# Patient Record
Sex: MALE | Race: WHITE | Employment: FULL TIME | URBAN - METROPOLITAN AREA
[De-identification: names, ages, dates, MRNs, and addresses within clinical notes are randomized per-mention and may not be internally consistent; named-entity substitution may affect disease eponyms.]

---

## 2020-12-24 ENCOUNTER — APPOINTMENT (OUTPATIENT)
Dept: CT IMAGING | Age: 75
DRG: 862 | End: 2020-12-24
Attending: EMERGENCY MEDICINE
Payer: MEDICARE

## 2020-12-24 ENCOUNTER — APPOINTMENT (OUTPATIENT)
Dept: GENERAL RADIOLOGY | Age: 75
DRG: 862 | End: 2020-12-24
Attending: EMERGENCY MEDICINE
Payer: MEDICARE

## 2020-12-24 ENCOUNTER — HOSPITAL ENCOUNTER (INPATIENT)
Age: 75
LOS: 3 days | Discharge: HOME | DRG: 862 | End: 2020-12-27
Attending: EMERGENCY MEDICINE | Admitting: FAMILY MEDICINE
Payer: MEDICARE

## 2020-12-24 DIAGNOSIS — R65.20 SEPSIS WITH ACUTE ORGAN DYSFUNCTION WITHOUT SEPTIC SHOCK, DUE TO UNSPECIFIED ORGANISM, UNSPECIFIED TYPE (HCC): Primary | ICD-10-CM

## 2020-12-24 DIAGNOSIS — R00.0 TACHYCARDIA: ICD-10-CM

## 2020-12-24 DIAGNOSIS — R77.8 TROPONIN LEVEL ELEVATED: ICD-10-CM

## 2020-12-24 DIAGNOSIS — I21.A1 TYPE 2 MYOCARDIAL INFARCTION (HCC): ICD-10-CM

## 2020-12-24 DIAGNOSIS — R82.81 PYURIA: ICD-10-CM

## 2020-12-24 DIAGNOSIS — A41.9 SEPSIS WITH ACUTE ORGAN DYSFUNCTION WITHOUT SEPTIC SHOCK, DUE TO UNSPECIFIED ORGANISM, UNSPECIFIED TYPE (HCC): Primary | ICD-10-CM

## 2020-12-24 PROBLEM — D72.829 LEUKOCYTOSIS: Status: ACTIVE | Noted: 2020-12-24

## 2020-12-24 PROBLEM — R50.9 FEVER: Status: ACTIVE | Noted: 2020-12-24

## 2020-12-24 PROBLEM — N39.0 UTI (URINARY TRACT INFECTION): Status: ACTIVE | Noted: 2020-12-24

## 2020-12-24 PROBLEM — I21.9 MI (MYOCARDIAL INFARCTION) (HCC): Status: ACTIVE | Noted: 2020-12-24

## 2020-12-24 PROBLEM — R31.9 HEMATURIA: Status: ACTIVE | Noted: 2020-12-24

## 2020-12-24 LAB
ALBUMIN SERPL-MCNC: 4.2 G/DL (ref 3.5–5)
ALBUMIN/GLOB SERPL: 1.3 {RATIO} (ref 1.1–2.2)
ALP SERPL-CCNC: 60 U/L (ref 45–117)
ALT SERPL-CCNC: 28 U/L (ref 12–78)
ANION GAP SERPL CALC-SCNC: 7 MMOL/L (ref 5–15)
APPEARANCE UR: ABNORMAL
APTT PPP: 26.2 SEC (ref 22.1–31)
AST SERPL-CCNC: 19 U/L (ref 15–37)
BACTERIA URNS QL MICRO: NEGATIVE /HPF
BASOPHILS # BLD: 0.2 K/UL (ref 0–0.1)
BASOPHILS NFR BLD: 1 % (ref 0–1)
BILIRUB SERPL-MCNC: 1.1 MG/DL (ref 0.2–1)
BILIRUB UR QL: NEGATIVE
BNP SERPL-MCNC: 427 PG/ML
BUN SERPL-MCNC: 19 MG/DL (ref 6–20)
BUN/CREAT SERPL: 15 (ref 12–20)
CALCIUM SERPL-MCNC: 9.1 MG/DL (ref 8.5–10.1)
CHLORIDE SERPL-SCNC: 103 MMOL/L (ref 97–108)
CO2 SERPL-SCNC: 27 MMOL/L (ref 21–32)
COLOR UR: ABNORMAL
COMMENT, HOLDF: NORMAL
COVID-19 RAPID TEST, COVR: NOT DETECTED
CREAT SERPL-MCNC: 1.3 MG/DL (ref 0.7–1.3)
CRP SERPL-MCNC: 1.47 MG/DL (ref 0–0.6)
D DIMER PPP FEU-MCNC: 0.43 MG/L FEU (ref 0–0.65)
DIFFERENTIAL METHOD BLD: ABNORMAL
EOSINOPHIL # BLD: 0 K/UL (ref 0–0.4)
EOSINOPHIL NFR BLD: 0 % (ref 0–7)
EPITH CASTS URNS QL MICRO: ABNORMAL /LPF
ERYTHROCYTE [DISTWIDTH] IN BLOOD BY AUTOMATED COUNT: 13.2 % (ref 11.5–14.5)
FERRITIN SERPL-MCNC: 110 NG/ML (ref 26–388)
FIBRINOGEN PPP-MCNC: 339 MG/DL (ref 200–475)
GLOBULIN SER CALC-MCNC: 3.2 G/DL (ref 2–4)
GLUCOSE SERPL-MCNC: 150 MG/DL (ref 65–100)
GLUCOSE UR STRIP.AUTO-MCNC: NEGATIVE MG/DL
HCT VFR BLD AUTO: 52.8 % (ref 36.6–50.3)
HEALTH STATUS, XMCV2T: NORMAL
HGB BLD-MCNC: 18.3 G/DL (ref 12.1–17)
HGB UR QL STRIP: ABNORMAL
HYALINE CASTS URNS QL MICRO: ABNORMAL /LPF (ref 0–5)
IMM GRANULOCYTES # BLD AUTO: 0 K/UL
IMM GRANULOCYTES NFR BLD AUTO: 0 %
INR PPP: 1.1 (ref 0.9–1.1)
KETONES UR QL STRIP.AUTO: ABNORMAL MG/DL
LACTATE BLD-SCNC: 1.07 MMOL/L (ref 0.4–2)
LDH SERPL L TO P-CCNC: 226 U/L (ref 85–241)
LEUKOCYTE ESTERASE UR QL STRIP.AUTO: ABNORMAL
LYMPHOCYTES # BLD: 0.9 K/UL (ref 0.8–3.5)
LYMPHOCYTES NFR BLD: 6 % (ref 12–49)
MAGNESIUM SERPL-MCNC: 1.8 MG/DL (ref 1.6–2.4)
MCH RBC QN AUTO: 30.8 PG (ref 26–34)
MCHC RBC AUTO-ENTMCNC: 34.7 G/DL (ref 30–36.5)
MCV RBC AUTO: 88.7 FL (ref 80–99)
MONOCYTES # BLD: 0.9 K/UL (ref 0–1)
MONOCYTES NFR BLD: 6 % (ref 5–13)
NEUTS BAND NFR BLD MANUAL: 9 % (ref 0–6)
NEUTS SEG # BLD: 13.1 K/UL (ref 1.8–8)
NEUTS SEG NFR BLD: 78 % (ref 32–75)
NITRITE UR QL STRIP.AUTO: NEGATIVE
NRBC # BLD: 0 K/UL (ref 0–0.01)
NRBC BLD-RTO: 0 PER 100 WBC
PH UR STRIP: 5 [PH] (ref 5–8)
PLATELET # BLD AUTO: 142 K/UL (ref 150–400)
PMV BLD AUTO: 10.4 FL (ref 8.9–12.9)
POTASSIUM SERPL-SCNC: 3.4 MMOL/L (ref 3.5–5.1)
PROCALCITONIN SERPL-MCNC: 0.74 NG/ML
PROT SERPL-MCNC: 7.4 G/DL (ref 6.4–8.2)
PROT UR STRIP-MCNC: 30 MG/DL
PROTHROMBIN TIME: 11.2 SEC (ref 9–11.1)
RBC # BLD AUTO: 5.95 M/UL (ref 4.1–5.7)
RBC #/AREA URNS HPF: >100 /HPF (ref 0–5)
RBC MORPH BLD: ABNORMAL
SAMPLES BEING HELD,HOLD: NORMAL
SODIUM SERPL-SCNC: 137 MMOL/L (ref 136–145)
SOURCE, COVRS: NORMAL
SP GR UR REFRACTOMETRY: 1.02 (ref 1–1.03)
SPECIMEN SOURCE, FCOV2M: NORMAL
SPECIMEN TYPE, XMCV1T: NORMAL
THERAPEUTIC RANGE,PTTT: NORMAL SECS (ref 58–77)
TROPONIN I SERPL-MCNC: 0.88 NG/ML
UR CULT HOLD, URHOLD: NORMAL
UROBILINOGEN UR QL STRIP.AUTO: 0.2 EU/DL (ref 0.2–1)
WBC # BLD AUTO: 15.1 K/UL (ref 4.1–11.1)
WBC URNS QL MICRO: >100 /HPF (ref 0–4)

## 2020-12-24 PROCEDURE — 80053 COMPREHEN METABOLIC PANEL: CPT

## 2020-12-24 PROCEDURE — 83615 LACTATE (LD) (LDH) ENZYME: CPT

## 2020-12-24 PROCEDURE — 87186 SC STD MICRODIL/AGAR DIL: CPT

## 2020-12-24 PROCEDURE — 85379 FIBRIN DEGRADATION QUANT: CPT

## 2020-12-24 PROCEDURE — 85384 FIBRINOGEN ACTIVITY: CPT

## 2020-12-24 PROCEDURE — 84484 ASSAY OF TROPONIN QUANT: CPT

## 2020-12-24 PROCEDURE — 99285 EMERGENCY DEPT VISIT HI MDM: CPT

## 2020-12-24 PROCEDURE — 81001 URINALYSIS AUTO W/SCOPE: CPT

## 2020-12-24 PROCEDURE — 83605 ASSAY OF LACTIC ACID: CPT

## 2020-12-24 PROCEDURE — 87040 BLOOD CULTURE FOR BACTERIA: CPT

## 2020-12-24 PROCEDURE — 71045 X-RAY EXAM CHEST 1 VIEW: CPT

## 2020-12-24 PROCEDURE — 71275 CT ANGIOGRAPHY CHEST: CPT

## 2020-12-24 PROCEDURE — 93005 ELECTROCARDIOGRAM TRACING: CPT

## 2020-12-24 PROCEDURE — 83880 ASSAY OF NATRIURETIC PEPTIDE: CPT

## 2020-12-24 PROCEDURE — 74011000636 HC RX REV CODE- 636: Performed by: RADIOLOGY

## 2020-12-24 PROCEDURE — 87635 SARS-COV-2 COVID-19 AMP PRB: CPT

## 2020-12-24 PROCEDURE — 82728 ASSAY OF FERRITIN: CPT

## 2020-12-24 PROCEDURE — 65660000000 HC RM CCU STEPDOWN

## 2020-12-24 PROCEDURE — 85610 PROTHROMBIN TIME: CPT

## 2020-12-24 PROCEDURE — 36415 COLL VENOUS BLD VENIPUNCTURE: CPT

## 2020-12-24 PROCEDURE — 84145 PROCALCITONIN (PCT): CPT

## 2020-12-24 PROCEDURE — 74011250637 HC RX REV CODE- 250/637: Performed by: EMERGENCY MEDICINE

## 2020-12-24 PROCEDURE — 86140 C-REACTIVE PROTEIN: CPT

## 2020-12-24 PROCEDURE — 85730 THROMBOPLASTIN TIME PARTIAL: CPT

## 2020-12-24 PROCEDURE — 96374 THER/PROPH/DIAG INJ IV PUSH: CPT

## 2020-12-24 PROCEDURE — 74011000250 HC RX REV CODE- 250: Performed by: EMERGENCY MEDICINE

## 2020-12-24 PROCEDURE — 74011250636 HC RX REV CODE- 250/636: Performed by: EMERGENCY MEDICINE

## 2020-12-24 PROCEDURE — 83735 ASSAY OF MAGNESIUM: CPT

## 2020-12-24 PROCEDURE — 85025 COMPLETE CBC W/AUTO DIFF WBC: CPT

## 2020-12-24 PROCEDURE — 87086 URINE CULTURE/COLONY COUNT: CPT

## 2020-12-24 PROCEDURE — 74176 CT ABD & PELVIS W/O CONTRAST: CPT

## 2020-12-24 PROCEDURE — 87077 CULTURE AEROBIC IDENTIFY: CPT

## 2020-12-24 RX ORDER — ACETAMINOPHEN 500 MG
1000 TABLET ORAL
Status: COMPLETED | OUTPATIENT
Start: 2020-12-24 | End: 2020-12-24

## 2020-12-24 RX ORDER — SODIUM CHLORIDE 0.9 % (FLUSH) 0.9 %
5-10 SYRINGE (ML) INJECTION AS NEEDED
Status: DISCONTINUED | OUTPATIENT
Start: 2020-12-24 | End: 2020-12-27 | Stop reason: HOSPADM

## 2020-12-24 RX ORDER — ASPIRIN 325 MG
325 TABLET ORAL
Status: COMPLETED | OUTPATIENT
Start: 2020-12-24 | End: 2020-12-24

## 2020-12-24 RX ORDER — SODIUM CHLORIDE 0.9 % (FLUSH) 0.9 %
5-40 SYRINGE (ML) INJECTION EVERY 8 HOURS
Status: DISCONTINUED | OUTPATIENT
Start: 2020-12-24 | End: 2020-12-27 | Stop reason: HOSPADM

## 2020-12-24 RX ORDER — ACETAMINOPHEN 650 MG/1
650 SUPPOSITORY RECTAL
Status: DISCONTINUED | OUTPATIENT
Start: 2020-12-24 | End: 2020-12-27 | Stop reason: HOSPADM

## 2020-12-24 RX ORDER — ACETAMINOPHEN 325 MG/1
650 TABLET ORAL
Status: DISCONTINUED | OUTPATIENT
Start: 2020-12-24 | End: 2020-12-27 | Stop reason: HOSPADM

## 2020-12-24 RX ORDER — SODIUM CHLORIDE 0.9 % (FLUSH) 0.9 %
5-40 SYRINGE (ML) INJECTION AS NEEDED
Status: DISCONTINUED | OUTPATIENT
Start: 2020-12-24 | End: 2020-12-27 | Stop reason: HOSPADM

## 2020-12-24 RX ORDER — SODIUM CHLORIDE 9 MG/ML
125 INJECTION, SOLUTION INTRAVENOUS CONTINUOUS
Status: DISCONTINUED | OUTPATIENT
Start: 2020-12-24 | End: 2020-12-27

## 2020-12-24 RX ORDER — POLYETHYLENE GLYCOL 3350 17 G/17G
17 POWDER, FOR SOLUTION ORAL DAILY PRN
Status: DISCONTINUED | OUTPATIENT
Start: 2020-12-24 | End: 2020-12-27 | Stop reason: HOSPADM

## 2020-12-24 RX ADMIN — IOPAMIDOL 80 ML: 755 INJECTION, SOLUTION INTRAVENOUS at 19:20

## 2020-12-24 RX ADMIN — CEFTRIAXONE 1 G: 1 INJECTION, POWDER, FOR SOLUTION INTRAMUSCULAR; INTRAVENOUS at 19:56

## 2020-12-24 RX ADMIN — ASPIRIN 325 MG: 325 TABLET ORAL at 19:56

## 2020-12-24 RX ADMIN — ACETAMINOPHEN 1000 MG: 500 TABLET ORAL at 17:55

## 2020-12-24 RX ADMIN — SODIUM CHLORIDE 1000 ML: 9 INJECTION, SOLUTION INTRAVENOUS at 19:56

## 2020-12-24 NOTE — ED TRIAGE NOTES
Pt reports fatigue, urinary sx, and fever for the past few days. Pt states he feels \"wiped out. \" also reports one episode of urinary incontinence. Recently had a urology procedure due to hx of kidney stones. Temp 100.7 and tachycardic in triage.

## 2020-12-24 NOTE — ED PROVIDER NOTES
The history is provided by the patient. Fever   This is a new problem. The current episode started 12 to 24 hours ago. The problem occurs constantly. The problem has not changed since onset. The maximum temperature noted was 100 - 100.9 F. Associated symptoms include cough (occasional) and urinary symptoms. Pertinent negatives include no vomiting, no shortness of breath and no mental status change. Associated symptoms comments: Urinary incontinence after cystoscopy yesterday. Was being performed for hematuria evaluation, history of kidney stones. Having fatigue and generally feeling unwell. . He has tried nothing for the symptoms. No past medical history on file. No past surgical history on file. No family history on file.     Social History     Socioeconomic History    Marital status:      Spouse name: Not on file    Number of children: Not on file    Years of education: Not on file    Highest education level: Not on file   Occupational History    Not on file   Social Needs    Financial resource strain: Not on file    Food insecurity     Worry: Not on file     Inability: Not on file    Transportation needs     Medical: Not on file     Non-medical: Not on file   Tobacco Use    Smoking status: Not on file   Substance and Sexual Activity    Alcohol use: Not on file    Drug use: Not on file    Sexual activity: Not on file   Lifestyle    Physical activity     Days per week: Not on file     Minutes per session: Not on file    Stress: Not on file   Relationships    Social connections     Talks on phone: Not on file     Gets together: Not on file     Attends Muslim service: Not on file     Active member of club or organization: Not on file     Attends meetings of clubs or organizations: Not on file     Relationship status: Not on file    Intimate partner violence     Fear of current or ex partner: Not on file     Emotionally abused: Not on file     Physically abused: Not on file Forced sexual activity: Not on file   Other Topics Concern    Not on file   Social History Narrative    Not on file         ALLERGIES: Patient has no known allergies. Review of Systems   Constitutional: Positive for fever. Respiratory: Positive for cough (occasional). Negative for shortness of breath. Gastrointestinal: Negative for vomiting. All other systems reviewed and are negative. Vitals:    12/24/20 1745 12/24/20 1800 12/24/20 1815 12/24/20 1830   BP: (!) 148/85  (!) 134/96 (!) 143/78   Pulse: (!) 118 (!) 120 (!) 124 (!) 114   Resp: (!) 35 22 (!) 47 (!) 33   Temp:       SpO2: 94% 93%     Weight:       Height:                Physical Exam  Vitals signs and nursing note reviewed. Constitutional:       General: He is not in acute distress. Appearance: He is well-developed. HENT:      Head: Normocephalic and atraumatic. Eyes:      Conjunctiva/sclera: Conjunctivae normal.   Neck:      Musculoskeletal: Neck supple. Trachea: No tracheal deviation. Cardiovascular:      Rate and Rhythm: Regular rhythm. Tachycardia present. Occasional extrasystoles are present. Heart sounds: Normal heart sounds. Pulmonary:      Effort: Pulmonary effort is normal. No respiratory distress. Abdominal:      General: There is no distension. Musculoskeletal: Normal range of motion. General: No deformity. Skin:     General: Skin is warm and dry. Neurological:      Mental Status: He is alert. Cranial Nerves: No cranial nerve deficit. Psychiatric:         Behavior: Behavior normal.          MDM     65-year-old male presents with fever, fatigue and feeling unwell today. He had a procedure performed at urology office through [facility] for hematuria and was told that he had some kidney stones but no other issues. Following the procedure he did have some incontinence but was otherwise well feeling.     He has vital sign abnormalities on arrival here concerning for developing sepsis or symptomatic Covid infection. No infiltrate on x-ray, he has an abnormal EKG without comparison available which may be his baseline or driven by tachycardia/infection. Procedures    EKG 1753: Rate 119, sinus tachycardia, biatrial enlargement, incomplete RBBB with secondary repolarization abnormality, abnormal EKG. No previous tracings available for review. Perfect Serve Consult for Admission  7:58 PM    ED Room Number: BM26/86  Patient Name and age:  Raciel Melgar 67 y.o.  male  Working Diagnosis:   1. Sepsis with acute organ dysfunction without septic shock, due to unspecified organism, unspecified type ([facility] [address]    2. Type 2 myocardial infarction ([facility] [address]    3. Pyuria        COVID-19 Suspicion:  yes  Sepsis present:  yes  Reassessment needed: no  Code Status:  Full Code  Readmission: no  Isolation Requirements:  yes  Recommended Level of Care:  telemetry  Department:[facility] ED - [phone]  Other: 78-year-old male presenting with fatigue, fever starting over the course of the day and recent urologic procedure performed yesterday for hematuria and kidney stones. He has saleem pyuria here suggesting possible urinary tract infection driving sepsis. He has been screened for coronavirus and rapid swab was indeterminate so we are repeating that. No evidence of airspace disease or pulmonary embolism but he does have some EKG abnormalities and troponin elevation suggesting a type 2 MI being driven by infection. Fluid resuscitation started. Empirically treated with Rocephin pending culture data. 8:34 PM  Consult called to Dr Tiffanie Wood, consult for [facility] urology. I reviewed available results and clinical information. They recommend continuing care and current management of infection. Discussed with Dr Lina London of cardiology regarding troponin elevation, will trend troponin and they will come to see in the morning in consultation.

## 2020-12-25 LAB
ALBUMIN SERPL-MCNC: 2.4 G/DL (ref 3.5–5)
ALBUMIN SERPL-MCNC: 3.4 G/DL (ref 3.5–5)
ALBUMIN SERPL-MCNC: 3.4 G/DL (ref 3.5–5)
ALBUMIN/GLOB SERPL: 1 {RATIO} (ref 1.1–2.2)
ALP SERPL-CCNC: 40 U/L (ref 45–117)
ALT SERPL-CCNC: 17 U/L (ref 12–78)
ANION GAP SERPL CALC-SCNC: 2 MMOL/L (ref 5–15)
ANION GAP SERPL CALC-SCNC: 3 MMOL/L (ref 5–15)
ANION GAP SERPL CALC-SCNC: 6 MMOL/L (ref 5–15)
APTT PPP: 23.4 SEC (ref 22.1–31)
AST SERPL-CCNC: 14 U/L (ref 15–37)
BASOPHILS # BLD: 0.2 K/UL (ref 0–0.1)
BASOPHILS NFR BLD: 1 % (ref 0–1)
BILIRUB SERPL-MCNC: 0.8 MG/DL (ref 0.2–1)
BUN SERPL-MCNC: 15 MG/DL (ref 6–20)
BUN SERPL-MCNC: 18 MG/DL (ref 6–20)
BUN SERPL-MCNC: 19 MG/DL (ref 6–20)
BUN/CREAT SERPL: 14 (ref 12–20)
BUN/CREAT SERPL: 15 (ref 12–20)
BUN/CREAT SERPL: 17 (ref 12–20)
CALCIUM SERPL-MCNC: 5.9 MG/DL (ref 8.5–10.1)
CALCIUM SERPL-MCNC: 8.2 MG/DL (ref 8.5–10.1)
CALCIUM SERPL-MCNC: 8.6 MG/DL (ref 8.5–10.1)
CHLORIDE SERPL-SCNC: 107 MMOL/L (ref 97–108)
CHLORIDE SERPL-SCNC: 107 MMOL/L (ref 97–108)
CHLORIDE SERPL-SCNC: 116 MMOL/L (ref 97–108)
CO2 SERPL-SCNC: 22 MMOL/L (ref 21–32)
CO2 SERPL-SCNC: 28 MMOL/L (ref 21–32)
CO2 SERPL-SCNC: 29 MMOL/L (ref 21–32)
COMMENT, HOLDF: NORMAL
COMMENT, HOLDF: NORMAL
CREAT SERPL-MCNC: 0.89 MG/DL (ref 0.7–1.3)
CREAT SERPL-MCNC: 1.23 MG/DL (ref 0.7–1.3)
CREAT SERPL-MCNC: 1.33 MG/DL (ref 0.7–1.3)
DIFFERENTIAL METHOD BLD: ABNORMAL
EOSINOPHIL # BLD: 0 K/UL (ref 0–0.4)
EOSINOPHIL NFR BLD: 0 % (ref 0–7)
ERYTHROCYTE [DISTWIDTH] IN BLOOD BY AUTOMATED COUNT: 13.4 % (ref 11.5–14.5)
FIBRINOGEN PPP-MCNC: 377 MG/DL (ref 200–475)
FLUAV AG NPH QL IA: NEGATIVE
FLUBV AG NOSE QL IA: NEGATIVE
GLOBULIN SER CALC-MCNC: 2.3 G/DL (ref 2–4)
GLUCOSE SERPL-MCNC: 120 MG/DL (ref 65–100)
GLUCOSE SERPL-MCNC: 156 MG/DL (ref 65–100)
GLUCOSE SERPL-MCNC: 164 MG/DL (ref 65–100)
HCT VFR BLD AUTO: 49.7 % (ref 36.6–50.3)
HEALTH STATUS, XMCV2T: NORMAL
HGB BLD-MCNC: 17.1 G/DL (ref 12.1–17)
IMM GRANULOCYTES # BLD AUTO: 0 K/UL
IMM GRANULOCYTES NFR BLD AUTO: 0 %
INR PPP: 1.2 (ref 0.9–1.1)
LYMPHOCYTES # BLD: 0.5 K/UL (ref 0.8–3.5)
LYMPHOCYTES NFR BLD: 3 % (ref 12–49)
MAGNESIUM SERPL-MCNC: 1.3 MG/DL (ref 1.6–2.4)
MAGNESIUM SERPL-MCNC: 2 MG/DL (ref 1.6–2.4)
MAGNESIUM SERPL-MCNC: 2.4 MG/DL (ref 1.6–2.4)
MCH RBC QN AUTO: 30.9 PG (ref 26–34)
MCHC RBC AUTO-ENTMCNC: 34.4 G/DL (ref 30–36.5)
MCV RBC AUTO: 89.7 FL (ref 80–99)
METAMYELOCYTES NFR BLD MANUAL: 2 %
MONOCYTES # BLD: 1.1 K/UL (ref 0–1)
MONOCYTES NFR BLD: 7 % (ref 5–13)
NEUTS BAND NFR BLD MANUAL: 19 % (ref 0–6)
NEUTS SEG # BLD: 14.3 K/UL (ref 1.8–8)
NEUTS SEG NFR BLD: 68 % (ref 32–75)
NRBC # BLD: 0 K/UL (ref 0–0.01)
NRBC BLD-RTO: 0 PER 100 WBC
PLATELET # BLD AUTO: 121 K/UL (ref 150–400)
PMV BLD AUTO: 9.8 FL (ref 8.9–12.9)
POTASSIUM SERPL-SCNC: 2.8 MMOL/L (ref 3.5–5.1)
POTASSIUM SERPL-SCNC: 3.9 MMOL/L (ref 3.5–5.1)
POTASSIUM SERPL-SCNC: 4.4 MMOL/L (ref 3.5–5.1)
PROT SERPL-MCNC: 4.7 G/DL (ref 6.4–8.2)
PROTHROMBIN TIME: 12.1 SEC (ref 9–11.1)
RBC # BLD AUTO: 5.54 M/UL (ref 4.1–5.7)
RBC MORPH BLD: ABNORMAL
SAMPLES BEING HELD,HOLD: NORMAL
SAMPLES BEING HELD,HOLD: NORMAL
SARS-COV-2, COV2: NOT DETECTED
SODIUM SERPL-SCNC: 138 MMOL/L (ref 136–145)
SODIUM SERPL-SCNC: 138 MMOL/L (ref 136–145)
SODIUM SERPL-SCNC: 144 MMOL/L (ref 136–145)
SOURCE, COVRS: NORMAL
SPECIMEN SOURCE, FCOV2M: NORMAL
SPECIMEN TYPE, XMCV1T: NORMAL
THERAPEUTIC RANGE,PTTT: NORMAL SECS (ref 58–77)
TROPONIN I SERPL-MCNC: 0.73 NG/ML
TROPONIN I SERPL-MCNC: 0.76 NG/ML
WBC # BLD AUTO: 16.4 K/UL (ref 4.1–11.1)

## 2020-12-25 PROCEDURE — 87804 INFLUENZA ASSAY W/OPTIC: CPT

## 2020-12-25 PROCEDURE — 74011000250 HC RX REV CODE- 250: Performed by: INTERNAL MEDICINE

## 2020-12-25 PROCEDURE — 74011250636 HC RX REV CODE- 250/636: Performed by: INTERNAL MEDICINE

## 2020-12-25 PROCEDURE — 85730 THROMBOPLASTIN TIME PARTIAL: CPT

## 2020-12-25 PROCEDURE — 85384 FIBRINOGEN ACTIVITY: CPT

## 2020-12-25 PROCEDURE — 99223 1ST HOSP IP/OBS HIGH 75: CPT | Performed by: SPECIALIST

## 2020-12-25 PROCEDURE — 87635 SARS-COV-2 COVID-19 AMP PRB: CPT

## 2020-12-25 PROCEDURE — 83735 ASSAY OF MAGNESIUM: CPT

## 2020-12-25 PROCEDURE — 74011250636 HC RX REV CODE- 250/636: Performed by: FAMILY MEDICINE

## 2020-12-25 PROCEDURE — 65660000000 HC RM CCU STEPDOWN

## 2020-12-25 PROCEDURE — 82040 ASSAY OF SERUM ALBUMIN: CPT

## 2020-12-25 PROCEDURE — 74011250637 HC RX REV CODE- 250/637: Performed by: FAMILY MEDICINE

## 2020-12-25 PROCEDURE — 85025 COMPLETE CBC W/AUTO DIFF WBC: CPT

## 2020-12-25 PROCEDURE — 85610 PROTHROMBIN TIME: CPT

## 2020-12-25 PROCEDURE — 80053 COMPREHEN METABOLIC PANEL: CPT

## 2020-12-25 PROCEDURE — 74011250637 HC RX REV CODE- 250/637: Performed by: INTERNAL MEDICINE

## 2020-12-25 PROCEDURE — 36415 COLL VENOUS BLD VENIPUNCTURE: CPT

## 2020-12-25 PROCEDURE — 84484 ASSAY OF TROPONIN QUANT: CPT

## 2020-12-25 PROCEDURE — 80048 BASIC METABOLIC PNL TOTAL CA: CPT

## 2020-12-25 RX ORDER — SIMVASTATIN 40 MG/1
40 TABLET, FILM COATED ORAL
COMMUNITY

## 2020-12-25 RX ORDER — POTASSIUM CHLORIDE 750 MG/1
40 TABLET, FILM COATED, EXTENDED RELEASE ORAL
Status: DISCONTINUED | OUTPATIENT
Start: 2020-12-25 | End: 2020-12-25

## 2020-12-25 RX ORDER — CALCIUM GLUCONATE 20 MG/ML
1 INJECTION, SOLUTION INTRAVENOUS ONCE
Status: DISCONTINUED | OUTPATIENT
Start: 2020-12-25 | End: 2020-12-25

## 2020-12-25 RX ORDER — VANCOMYCIN/0.9 % SOD CHLORIDE 1.5G/250ML
1500 PLASTIC BAG, INJECTION (ML) INTRAVENOUS
Status: DISCONTINUED | OUTPATIENT
Start: 2020-12-26 | End: 2020-12-25

## 2020-12-25 RX ORDER — POTASSIUM CHLORIDE 750 MG/1
40 TABLET, FILM COATED, EXTENDED RELEASE ORAL
Status: COMPLETED | OUTPATIENT
Start: 2020-12-25 | End: 2020-12-25

## 2020-12-25 RX ORDER — MAGNESIUM SULFATE HEPTAHYDRATE 40 MG/ML
2 INJECTION, SOLUTION INTRAVENOUS ONCE
Status: DISCONTINUED | OUTPATIENT
Start: 2020-12-25 | End: 2020-12-25

## 2020-12-25 RX ORDER — ATORVASTATIN CALCIUM 20 MG/1
20 TABLET, FILM COATED ORAL
Status: DISCONTINUED | OUTPATIENT
Start: 2020-12-25 | End: 2020-12-27 | Stop reason: HOSPADM

## 2020-12-25 RX ADMIN — SODIUM CHLORIDE 125 ML/HR: 900 INJECTION, SOLUTION INTRAVENOUS at 10:26

## 2020-12-25 RX ADMIN — SODIUM CHLORIDE 125 ML/HR: 900 INJECTION, SOLUTION INTRAVENOUS at 21:58

## 2020-12-25 RX ADMIN — Medication 10 ML: at 05:11

## 2020-12-25 RX ADMIN — CEFEPIME HYDROCHLORIDE 2 G: 2 INJECTION, POWDER, FOR SOLUTION INTRAVENOUS at 10:26

## 2020-12-25 RX ADMIN — POTASSIUM CHLORIDE 40 MEQ: 750 TABLET, FILM COATED, EXTENDED RELEASE ORAL at 02:27

## 2020-12-25 RX ADMIN — Medication 10 ML: at 14:00

## 2020-12-25 RX ADMIN — SODIUM CHLORIDE 125 ML/HR: 900 INJECTION, SOLUTION INTRAVENOUS at 00:18

## 2020-12-25 RX ADMIN — ATORVASTATIN CALCIUM 20 MG: 20 TABLET, FILM COATED ORAL at 21:57

## 2020-12-25 RX ADMIN — VANCOMYCIN HYDROCHLORIDE 2500 MG: 10 INJECTION, POWDER, LYOPHILIZED, FOR SOLUTION INTRAVENOUS at 13:55

## 2020-12-25 RX ADMIN — Medication 10 ML: at 00:19

## 2020-12-25 RX ADMIN — CEFEPIME HYDROCHLORIDE 2 G: 2 INJECTION, POWDER, FOR SOLUTION INTRAVENOUS at 21:57

## 2020-12-25 NOTE — PROGRESS NOTES
0730 Bedside and Verbal shift change report given to Debby Lockwood Che RN (oncoming nurse) by Stefanie Bustos RN (offgoing nurse). Report included the following information SBAR and Kardex. 1045 Patient resting in bed unaware of change in HR, had 10 beat run of VTACH. Dr Jessica Bragg aware no orders. 1443 Unable to obtain blood cultures after 5 sticks, Dr Isaak Farias made aware. 1550 Patient face and neck bright red. Vancomycin stopped and Dr Isaak Farias notified. No order noted. 1915 Bedside and Verbal shift change report given to Stefanie Bustos RN (oncoming nurse) by Debby Giraldo RN (offgoing nurse). Report included the following information SBAR and Kardex.

## 2020-12-25 NOTE — CONSULTS
CARDIOLOGY CONSULTATION NOTE    Tomas Bravo MD,  [address], [place], [address] [facility]  Phone [phone]; Fax [phone]  Mobile [phone]   Voice Mail [phone]                               2020  5:18 PM  Maira Hanna MD  :  [id number]   MRN:  [id number]     CC: Fever and cough  Reason for consult:  Elevated troponin  Admission Diagnosis: MI (myocardial infarction) ([facility] [address] [I21.9]  Sepsis ([facility] [address] [A41.9]  Fever [R50.9]  Tachycardia [R00.0]  Leukocytosis [D72.829]  UTI (urinary tract infection) [N39.0]  Hematuria [R31.9]    Maira Hanna MD    Virtual visit on phone secondary to [address]:   This medical record was transcribed using an electronic medical records/speech recognition system. Although proofread, it may and can contain electronic, spelling and other errors. Corrections may be executed at a later time. Please feel free to contact us for any clarifications as needed. Impression Plan/Recommendation   1. Elevated troponin              Chest x-ray no acute process CT of abdomen was just enlarged prostate creatinine is 1.3  ECG is a sinus tachycardia with T wave inversions laterally and what appears to be incomplete right bundle branch block ·  his symptoms are not consistent with angina and I am unclear why a troponin was ordered but it was not as elevated. · He is able to walk up many flights of stairs with no problems and he is very active and states that he has not been having chest pain but he came to the emergency room for other reasons  · If anything this is most likely a type II MI from supply demand mismatch and I do not believe any further cardiac testing is indicated. · He had stress testing with Dr. Farhat Sarmiento in the past and was normal.    We will see as needed           Delmis Hinojosa is a 67 y.o. male I am seeing for elevated troponin. He was admitted with general fatigue and a fever.   I am unclear why a troponin was obtained but it was slightly elevated. CT of his abdomen just revealed enlarged prostate and chest x-ray revealed no acute process. He does have a slightly elevated white count. He states [place] [facility] [place] and had some injection and then he was at [facility] clinic at [facility] for hematuria. At that time Dr. Yovani Temple told him he had passed a stone and had damaged some tissue that has been responsible for the bleeding. On his way home from the clinic he suddenly just felt poorly and then developed a fever up to about 102. He states he is very active usually walks up stairs with no chest pain or shortness of breath. He did have a stress test with Dr. Tai Faria several years ago. No Known Allergies      No past medical history on file. No past surgical history on file. .Home Medications:  None       Hospital Medications:  Current Facility-Administered Medications   Medication Dose Route Frequency    iopamidoL (ISOVUE-370) 76 % injection        sodium chloride (NS) flush 5-10 mL  5-10 mL IntraVENous PRN    [START ON 12/25/2020] cefTRIAXone (ROCEPHIN) 1 g in sterile water (preservative free) 10 mL IV syringe  1 g IntraVENous Q24H    0.9% sodium chloride infusion  125 mL/hr IntraVENous CONTINUOUS     No current outpatient medications on file. OBJECTIVE       Laboratory and Imaging have been reviewed and are notable for      ECG:  As above      Diagnostic Tests:     Recent Labs     12/24/20  1800   TROIQ 0.88*     Recent Labs     12/24/20  1800      K 3.4*   CO2 27   BUN 19   CREA 1.30   *   MG 1.8   WBC 15.1*   HGB 18.3*   HCT 52.8*   *         Cardiac work up to date:  No specialty comments available. Social History:  Social History     Tobacco Use    Smoking status: Not on file   Substance Use Topics    Alcohol use: Not on file       Family History:  No family history on file.     Review of Symptoms:  A comprehensive review of systems was negative except for that written in the HPI. Physical Exam:      Visit Vitals  /75   Pulse (!) 101   Temp (!) 100.7 °F (38.2 °C)   Resp 24   Ht 6' 2\" (1.88 m)   Wt 207 lb (93.9 kg)   SpO2 94%   BMI 26.58 kg/m²     General Appearance:   Very cooperative over the phone and excellent historian                                                    I have discussed the diagnosis with the patient and the intended plan as seen in the above orders. Questions were answered concerning future plans. I have discussed medication side effects and warnings with the patient as well. Gail Dangelo is in agreement to the plan listed above and wishes to proceed. he  was instructed not to smoke, eat heart healthy diet  and to exercise. Thank you for this consult.       Hamlet Arboleda MD

## 2020-12-25 NOTE — PROGRESS NOTES
Admission Medication Reconciliation:     Information obtained from:    Patient via phone call to [facility]:  YES    Comments/Recommendations:   Patient able to confirm name, , allergies, and preferred pharmacy  Updated PTA medication list       ¹Rx[employer] pharmacy benefit data reflects medications filled and processed through the patient's insurance, however   this data does NOT capture whether the medication was picked up or is currently being taken by the patient. Prior to Admission Medications   Prescriptions Last Dose Informant Taking?   simvastatin (ZOCOR) 40 mg tablet 2020  Yes   Sig: Take 40 mg by mouth nightly. Facility-Administered Medications: None         Please contact the main inpatient pharmacy with any questions or concerns at [phone] and we will direct you to the clinical pharmacist covering this patient's care while in-house.    Karlee Godinez, SouravD, BCPS

## 2020-12-25 NOTE — PROGRESS NOTES
[facility] Pharm[facility] Dosing Services: Antimicrobial Stewardship Progress Note    Consult for Vancomycin dosing by Dr. Evelia Nazario. Pharmacist reviewed antibiotic appropriateness for this 67year old , male  for indication of ? Bacteremia. Day of Therapy 1    Plan:  Vancomycin therapy:  Start Vancomycin therapy, with loading dose of 2500 mg. Follow with maintenance dose of 1500 mg, every 16 hours. Dose calculated to approximate a therapeutic trough of 15-20 mcg/mL. Plan for level: Before 3rd total dose  Pharmacy to follow daily and will make changes to dose and/or frequency based on clinical status. Date Dose & Interval Measured (mcg/mL) Extrapolated (mcg/mL)   ? ? ? ?   ? ? ? ?   ? ? ? ? Non-Kinetic Antimicrobial Dosing:   Current Regimen:  Cefepime 2 gm IV Q 12 hrs (auto adjusted per protocol)    Other Antimicrobial  (not dosed by pharmacist)      Cultures     12/25 Urine: ordered  12/24 Blood: 1/4 bottles (+)     Serum Creatinine     Lab Results   Component Value Date/Time    Creatinine 1.23 12/25/2020 07:14 AM       Creatinine Clearance Estimated Creatinine Clearance: 59.4 mL/min (based on SCr of 1.23 mg/dL).      Procalcitonin    Lab Results   Component Value Date/Time    Procalcitonin 0.74 12/24/2020 06:00 PM        Temp   99 °F (37.2 °C)    WBC   Lab Results   Component Value Date/Time    WBC 16.4 (H) 12/25/2020 12:26 AM       H/H   Lab Results   Component Value Date/Time    HGB 17.1 (H) 12/25/2020 12:26 AM      Platelets Lab Results   Component Value Date/Time    PLATELET 616 (L) 54/58/9466 12:26 AM            Pharmacist: Signed Sugey Bruner PHARMD Contact information: [phone]

## 2020-12-25 NOTE — PROGRESS NOTES
[address] [address] Pharmacy Dosing Services: Antimicrobial Stewardship Daily Doc 12/25/2020    Consult for antibiotic dosing of Cefepime by Dr. Yaa Sanchez  Indication: Sepsis unknown etiology, UTI w/ hx recent cystoscopy  Day of Therapy 1    Ht Readings from Last 1 Encounters:   12/24/20 188 cm (74\")        Wt Readings from Last 1 Encounters:   12/24/20 98.3 kg (216 lb 11.4 oz)      Non-Kinetic Antimicrobial Dosing Regimen:   Current Regimen:  Cefepime 2 grams Q8 hrs  Recommendation: Cefepime dose adjusted to 2 grams Q12 hours for CrCl 30-60 ml/min  Dose administration notes:   Doses given appropriately as scheduled    Other Antimicrobial   (not dosed by pharmacist) None   Cultures 12/24 Blood (paired): NGTD, Prelim  12/24 Urine: Pending   Serum Creatinine Lab Results   Component Value Date/Time    Creatinine 1.23 12/25/2020 07:14 AM         Creatinine Clearance Estimated Creatinine Clearance: 59.4 mL/min (based on SCr of 1.23 mg/dL).      Temp Temp: 98.5 °F (36.9 °C)       WBC Lab Results   Component Value Date/Time    WBC 16.4 (H) 12/25/2020 12:26 AM        H/H Lab Results   Component Value Date/Time    HGB 17.1 (H) 12/25/2020 12:26 AM        Platelets    Lab Results   Component Value Date/Time    PLATELET 739 (L) 57/35/4146 12:26 AM          Pharmacist Marcie Abarca, SOPHIAD Contact information: [phone]

## 2020-12-25 NOTE — H&P
History & Physical    Date of admission: 12/24/2020    Patient name: Rick Villatoro  MRN: [id number]  YOB: 1945  Age: 67 y.o. Primary care provider:  Santino Pope MD     Source of Information: patient, patient's son who is a ER physician, ED and electronic medical records                                Chief complaint:  Flu like symptoms, fever    History of present illness  Rick Villatoro is a 67 y.o. male with past medical history of kidney stones, hematuria, and hyperlipidemia presented to the ED from home with chief complaint of flu like symptoms and fever. Symptom onset reportedly began this afternoon, gradual onset, without specific known aggravating or alleviating factors. He notes his wife checked his temperature which was as high as 102 F. Patient notes that he had hematuria ~ 3 weeks ago but it had resolved. He reportedly had imaging with IV contrast (unspecified) and cystoscopy yesterday performed by his urologist at the [facility] with prior history of kidney stones. Otherwise, he notes that he has been in well health until today. He admitted to a non-productive cough and fatigue. On arrival to the ED today, initial recorded vital signs were T = 100.7 F, BP = 148/94, HR= 122, RR = 18, O2sat = 94% on room air. WBC = 15,100. Troponin = 0.88.  12 lead EKG showed sinus tachycardia, biatrial enlargement, T wave inversion in anterolateral leads at 119 bpm.  UA showed large leukocyte esterase, negative nitrites, WBC > 100, RBC > 100, negative bacteria. Urine culture was collected. Ceftriaxone 1 gram IV x 1 dose, 0.9% normal saline 1000 ml IV fluid bolus, and Aspirin 325 mg po x 1 was ordered. Patient is now seen for admission to the hospitalist service.   There were no reports of new onset syncope, loss of consciousness, headache, neck pain, visual disturbance, numbness, paresthesias, focal weakness, chest pain, palpitations,shortness of breath, wheezing, abdominal pain, nausea, vomiting, diarrhea, melena, dysuria, calf pain, increased leg swelling/ edema, rash, or known exposure to COVID 19. ED ordered SARS-COV 2 rapid test and patient was placed on isolation precautions pending results. PAST MEDICAL HISTORY:  Kidney stones  Hematuria  Hyperlipemia    PAST SURGICAL HISTORY:  Cystoscopy  Right shoulder surgery    MEDICATIONS:  Simvastatin 40 mg po once daily    ALLERGIES:  NO KNOWN DRUG ALLERGIES    FAMILY HISTORY:  Lung/ liver cancer                     Father  \"broken heart\" failure                Mother  Myocardial infarction                 Brother       Social history  Patient resides  x  Independently; lives at home   x             Ambulates  x  Independently                 Alcohol history   x  seldom           Smoking history  x  reportedly never smoked             Code status  x  Full code     DNR/DNI        Code status was discussed with the patient      Review of systems  Pertinent positives as noted in HPI. All other systems were reviewed and were negative. Physical Examination   Visit Vitals  /67   Pulse (!) 102   Temp (!) 100.7 °F (38.2 °C)   Resp 25   Ht 6' 2\" (1.88 m)   Wt 93.9 kg (207 lb)   SpO2 93%   BMI 26.58 kg/m²          O2 Device: Room air    General:  Patient in no acute respiratory distress   Head:  Normocephalic, without obvious abnormality, atraumatic   Eyes:  Conjunctivae/corneas clear. PERRL, EOMs intact   E/N/M/T: Nares normal. Septum midline.  No nasal drainage or sinus tenderness  Lips, mucosa, and tongue normal   Teeth and gums normal  Clear oropharynx   Neck: Normal appearance and movements, symmetrical, trachea midline  No palpable adenopathy  No thyroid enlargement, tenderness or nodules  No carotid bruit   No JVD   Lungs:   Symmetrical chest expansion and respiratory effort  Clear to auscultation bilaterally   Chest wall:  No tenderness or deformity   Heart:  Tachycardic  Regular rhythm   Sounds normal; no murmur, click, rub or gallop   Abdomen:   Soft, no tenderness  Bowel sounds normal  No masses or hepatosplenomegaly  No hernias present   Back: No CVA tenderness   Extremities: Extremities normal, atraumatic  No cyanosis or edema  No DVT signs   Pulses 2+ radial/ DP bilateral pulses   Skin: No rashes or ulcers   Musculo-      skeletal: Gait not tested  Normal symmetry, ROM, strength and tone   Neuro: GCS 15. Moves all extremities x 4. No slurred speech. No facial droop. Sensation grossly intact. No pronator drift. Psych: Alert, oriented x3           Data Review    24 Hour Results:  Recent Results (from the past 24 hour(s))   POC LACTIC ACID    Collection Time: 12/24/20  5:57 PM   Result Value Ref Range    Lactic Acid (POC) 1.07 0.40 - 5.15 mmol/L   METABOLIC PANEL, COMPREHENSIVE    Collection Time: 12/24/20  6:00 PM   Result Value Ref Range    Sodium 137 136 - 145 mmol/L    Potassium 3.4 (L) 3.5 - 5.1 mmol/L    Chloride 103 97 - 108 mmol/L    CO2 27 21 - 32 mmol/L    Anion gap 7 5 - 15 mmol/L    Glucose 150 (H) 65 - 100 mg/dL    BUN 19 6 - 20 MG/DL    Creatinine 1.30 0.70 - 1.30 MG/DL    BUN/Creatinine ratio 15 12 - 20      GFR est AA >60 >60 ml/min/1.73m2    GFR est non-AA 54 (L) >60 ml/min/1.73m2    Calcium 9.1 8.5 - 10.1 MG/DL    Bilirubin, total 1.1 (H) 0.2 - 1.0 MG/DL    ALT (SGPT) 28 12 - 78 U/L    AST (SGOT) 19 15 - 37 U/L    Alk.  phosphatase 60 45 - 117 U/L    Protein, total 7.4 6.4 - 8.2 g/dL    Albumin 4.2 3.5 - 5.0 g/dL    Globulin 3.2 2.0 - 4.0 g/dL    A-G Ratio 1.3 1.1 - 2.2     CBC WITH AUTOMATED DIFF    Collection Time: 12/24/20  6:00 PM   Result Value Ref Range    WBC 15.1 (H) 4.1 - 11.1 K/uL    RBC 5.95 (H) 4.10 - 5.70 M/uL    HGB 18.3 (H) 12.1 - 17.0 g/dL    HCT 52.8 (H) 36.6 - 50.3 %    MCV 88.7 80.0 - 99.0 FL    MCH 30.8 26.0 - 34.0 PG    MCHC 34.7 30.0 - 36.5 g/dL    RDW 13.2 11.5 - 14.5 %    PLATELET 520 (L) 673 - 400 K/uL    MPV 10.4 8.9 - 12.9 FL    NRBC 0.0 0  WBC    ABSOLUTE NRBC 0.00 0.00 - 0.01 K/uL    NEUTROPHILS 78 (H) 32 - 75 %    BAND NEUTROPHILS 9 (H) 0 - 6 %    LYMPHOCYTES 6 (L) 12 - 49 %    MONOCYTES 6 5 - 13 %    EOSINOPHILS 0 0 - 7 %    BASOPHILS 1 0 - 1 %    IMMATURE GRANULOCYTES 0 %    ABS. NEUTROPHILS 13.1 (H) 1.8 - 8.0 K/UL    ABS. LYMPHOCYTES 0.9 0.8 - 3.5 K/UL    ABS. MONOCYTES 0.9 0.0 - 1.0 K/UL    ABS. EOSINOPHILS 0.0 0.0 - 0.4 K/UL    ABS. BASOPHILS 0.2 (H) 0.0 - 0.1 K/UL    ABS. IMM. GRANS. 0.0 K/UL    DF MANUAL      RBC COMMENTS ANISOCYTOSIS  1+       TROPONIN I    Collection Time: 12/24/20  6:00 PM   Result Value Ref Range    Troponin-I, Qt. 0.88 (H) <0.05 ng/mL   SAMPLES BEING HELD    Collection Time: 12/24/20  6:00 PM   Result Value Ref Range    SAMPLES BEING HELD 1SST,1RED,1BLUE     COMMENT        Add-on orders for these samples will be processed based on acceptable specimen integrity and analyte stability, which may vary by analyte.    NT-PRO BNP    Collection Time: 12/24/20  6:00 PM   Result Value Ref Range    NT pro- <450 PG/ML   MAGNESIUM    Collection Time: 12/24/20  6:00 PM   Result Value Ref Range    Magnesium 1.8 1.6 - 2.4 mg/dL   D DIMER    Collection Time: 12/24/20  6:00 PM   Result Value Ref Range    D-dimer 0.43 0.00 - 0.65 mg/L FEU   PROCALCITONIN    Collection Time: 12/24/20  6:00 PM   Result Value Ref Range    Procalcitonin 0.74 ng/mL   FIBRINOGEN    Collection Time: 12/24/20  6:00 PM   Result Value Ref Range    Fibrinogen 339 200 - 475 mg/dL   PTT    Collection Time: 12/24/20  6:00 PM   Result Value Ref Range    aPTT 26.2 22.1 - 31.0 sec    aPTT, therapeutic range     58.0 - 77.0 SECS   PROTHROMBIN TIME + INR    Collection Time: 12/24/20  6:00 PM   Result Value Ref Range    INR 1.1 0.9 - 1.1      Prothrombin time 11.2 (H) 9.0 - 11.1 sec   LD    Collection Time: 12/24/20  6:00 PM   Result Value Ref Range     85 - 241 U/L   C REACTIVE PROTEIN, QT    Collection Time: 12/24/20  6:00 PM   Result Value Ref Range    C-Reactive protein 1.47 (H) 0.00 - 0.60 mg/dL   URINALYSIS W/MICROSCOPIC    Collection Time: 12/24/20  6:49 PM   Result Value Ref Range    Color YELLOW/STRAW      Appearance CLOUDY (A) CLEAR      Specific gravity 1.017 1.003 - 1.030      pH (UA) 5.0 5.0 - 8.0      Protein 30 (A) NEG mg/dL    Glucose Negative NEG mg/dL    Ketone TRACE (A) NEG mg/dL    Bilirubin Negative NEG      Blood LARGE (A) NEG      Urobilinogen 0.2 0.2 - 1.0 EU/dL    Nitrites Negative NEG      Leukocyte Esterase LARGE (A) NEG      WBC >100 (H) 0 - 4 /hpf    RBC >100 (H) 0 - 5 /hpf    Epithelial cells FEW FEW /lpf    Bacteria Negative NEG /hpf    Hyaline cast 0-2 0 - 5 /lpf   URINE CULTURE HOLD SAMPLE    Collection Time: 12/24/20  6:49 PM    Specimen: Serum; Urine   Result Value Ref Range    Urine culture hold        Urine on hold in Microbiology dept for 2 days. If unpreserved urine is submitted, it cannot be used for addtional testing after 24 hours, recollection will be required. SARS-COV-2    Collection Time: 12/24/20  7:53 PM   Result Value Ref Range    Specimen source Nasopharyngeal      Specimen source Nasopharyngeal      COVID-19 rapid test Not detected NOTD      Specimen type NP Swab      Health status Symptomatic Testing       Recent Labs     12/24/20  1800   WBC 15.1*   HGB 18.3*   HCT 52.8*   *     Recent Labs     12/24/20  1800      K 3.4*      CO2 27   *   BUN 19   CREA 1.30   CA 9.1   MG 1.8   ALB 4.2   TBILI 1.1*   ALT 28   INR 1.1       Imaging  CT ABD PELV WO CONT   FINDINGS:   LOWER THORAX: Minimal bronchial wall thickening in the right lower lobe. LIVER: No mass. BILIARY TREE: Gallbladder is within normal limits. CBD is not dilated. SPLEEN: within normal limits. PANCREAS: No focal abnormality. ADRENALS: Unremarkable. KIDNEYS/URETERS: Bilateral renal scarring. Bilateral renal cysts. STOMACH: Unremarkable.   SMALL BOWEL: No dilatation or wall thickening. COLON: No dilatation or wall thickening. APPENDIX: Normal.  PERITONEUM: No ascites or pneumoperitoneum. RETROPERITONEUM: No lymphadenopathy or aortic aneurysm. REPRODUCTIVE ORGANS: Enlarged prostate. URINARY BLADDER: No mass or calculus. BONES: Degenerative changes of the spine. ABDOMINAL WALL: No mass or hernia. ADDITIONAL COMMENTS: Contrast is seen within the collecting systems.     IMPRESSION:  No acute findings. Enlarged prostate. CTA CHEST W OR W WO CONT     INDICATION:   troponin elevated, tachycardia, fatigue     COMPARISON: None.     TECHNIQUE:   Precontrast  images were obtained to localize the volume for acquisition. Multislice helical CT arteriography was performed from the diaphragm to the  thoracic inlet during uneventful rapid bolus of 100 cc Isovue-370. Lung and soft  tissue windows were generated. Coronal and sagittal images were generated and  3D post processing consisting of coronal maximum intensity images was performed. CT dose reduction was achieved through use of a standardized protocol tailored  for this examination and automatic exposure control for dose modulation.       FINDINGS:  CHEST:  THYROID: There is a 15 mm soft tissue nodule posterior to the right thyroid  gland which may represent an exophytic thyroid nodule versus parathyroid  adenoma. MEDIASTINUM: No mass or lymphadenopathy. MELISSA: No mass or lymphadenopathy. THORACIC AORTA: No dissection or aneurysm. MAIN PULMONARY ARTERY: There is no evidence of pulmonary embolism. TRACHEA/BRONCHI: Patent. ESOPHAGUS: No wall thickening or dilatation. HEART: Normal in size. PLEURA: No effusion or pneumothorax. LUNGS: No nodule, mass, or airspace disease. INCIDENTALLY IMAGED UPPER ABDOMEN: Small hiatal hernia. Small bilateral renal  cysts, the largest of which measures 3.3 cm in the upper pole right kidney.   BONES: Degenerative changes are seen in the thoracic spine.        IMPRESSION: No acute process or evidence of pulmonary embolism. Assessment and Plan   1. Sepsis       - with noted tachycardia, fever, leukocytosis, possible UTI       - admit to telemetry       - ordered stat paired blood cultures       - check urine culture       - continue Ceftriaxone 1 gram IV q 24 hours       - check lactic acid level       - continue IV fluids for hydration    2.  MI (myocardial infarction)       - possible with elevated troponin and abnormal EKG       - cardiologist on call was consulted per ED MD       - will order serial repeat troponin levels       - continue telemetry monitoring       - order nuclear medicine cardiac stress test in a.m.       - given Aspirin         3.  UTI (urinary tract infection)       - with history of recent cystoscopy       - plan as above       - ED MD notedly has consulted urologist on call     4. Leukocytosis       - repeat WBC in a.m.    5.  Sinus tachycardia       - continue telemetry    6. Microscopic hematuria       - plan as noted    7. Fever        - may have Acetaminophen prn; monitor temperature    8. COVID suspicion       - ED already ordered rapid COVID 19 test; placed on isolation with PPE precautions    9. Hyperlipidemia       - check lipid panel       - resume home medication - Simvastatin    10. VTE prophylaxis         - SCDs to BLEs    CODE STATUS:   FULL CODE as discussed with patient who requested the same.       Sepsis ([facility] [address] (12/24/2020)           Signed by: Ean Rome MD    December 24, 2020 at 9:09 PM

## 2020-12-25 NOTE — PROGRESS NOTES
TRANSFER - IN REPORT:    Verbal report received from [place] (name) on Bertha Day  being received from ED (unit) for routine progression of care      Report consisted of patients Situation, Background, Assessment and   Recommendations(SBAR). Information from the following report(s) SBAR, Kardex and ED Summary was reviewed with the receiving nurse. Opportunity for questions and clarification was provided. Assessment completed upon patients arrival to unit and care assumed. 0118: Critical calcium of 5.9 and critical troponin of 0.76. Dr. Stephany Bamberger notified. Pt is resting in bed and has no new onset of chest pain. No new orders at this time. 0230: Repeat labs obtained. Potassium, magnesium, and calcium levels increased. MD notified and asked if he would like to continue with the calcium gluconate and magnesium sulfate. 5452: Orders to discontinue the calcium gluconate and mag sulfate. Bedside and Verbal shift change report given to April  (oncoming nurse) by Norma Betts (offgoing nurse). Report included the following information SBAR and Kardex.

## 2020-12-25 NOTE — PROGRESS NOTES
Blood culture show GNRs. Can stop IV vancomycin. Continue IV cefepime. Send surveillance blood cultures.

## 2020-12-25 NOTE — PROGRESS NOTES
[facility]  [address] [facility], [address] [place] [address]  [phone]      Medical Progress Note      NAME: Christina Otoole   :    MRM:  [id number]    Date/Time: 2020        Assessment / Plan:     68 yo M w/ hx of kidney stones s/p recent  instrumentation presenting with LUTS, fever, weakness, found to  have SIRS/possible sepsis, pyuria w/o bacteriuria, and elevated troponin. Sepsis: likely from UTI, given recent instrumentation. CT a/p showed no acute findings; no hydronephrosis. Blood culture flagged. Repeat blood culture. Broaden to IV cefepime, add IV vanc pending further culture results. Low suspicion for COVID-19. SARS-CoV-2 PCR pending    Type 2 MI: no CP. Mildly elevated troponin remaining flat. Evaluated by cardiology who thought no further ischemic testing needed. Check TTE    Hematuria / pyuria: undergoing urology evaluation, s/p cystoscopy. Follow up with urology outpatient. Treat UTI as above    Thrombocytopenia: may be from sepsis. Unclear baseline. Follow PLT      Total time spent: 35 minutes  Time spent in the care of this patient including reviewing records, discussing with nursing and/or other providers on the treatment team, obtaining history and examining the patient, and discussing treatment plans. Care Plan discussed with: Patient, Nursing Staff and >50% of time spent in counseling and coordination of care    Discussed:  Care Plan and D/C Planning    Prophylaxis:  Lovenox    Disposition:  Home w/Family         Subjective:     Chief Complaint:  Follow up UTI    Chart/notes/labs/studies reviewed, patient examined. Feels better. Got good rest. No CP or SOB        Objective:       Vitals:        Last 24hrs VS reviewed since prior progress note.  Most recent are:    Visit Vitals  /72 (BP 1 Location: Left arm, BP Patient Position: At rest)   Pulse 93   Temp 98.5 °F (36.9 °C)   Resp 20   Ht 6' 2\" (1.88 m)   Wt 98.3 kg (216 lb 11.4 oz)   SpO2 93%   BMI 27.82 kg/m²     SpO2 Readings from Last 6 Encounters:   12/25/20 93%            Intake/Output Summary (Last 24 hours) at 12/25/2020 5127  Last data filed at 12/25/2020 0602  Gross per 24 hour   Intake 1716.67 ml   Output    Net 1716.67 ml          Exam:     Physical Exam:    Gen:  Well-developed, well-nourished, in no acute distress. Very pleasant  HEENT:  Atraumatic, normocephalic. Sclerae nonicteric, hearing intact to voice  Neck:  Supple, without apparent masses. Resp:  No accessory muscle use, CTAB without wheezes, rales, or rhonchi  Card: RRR, without m/r/g. No LE edema. Abd:  +bowel sounds, soft, NTTP, nondistended. No HSM. Neuro: Face symmetric, speech fluent, follows commands appropriately, no focal weakness or numbness noted. Psych:  Alert, oriented x 3.  Good insight     Medications Reviewed: (see below)    Lab Data Reviewed: (see below)    ______________________________________________________________________    Medications:     Current Facility-Administered Medications   Medication Dose Route Frequency    cefepime (MAXIPIME) 2 g in sterile water (preservative free) 10 mL IV syringe  2 g IntraVENous Q12H    sodium chloride (NS) flush 5-10 mL  5-10 mL IntraVENous PRN    0.9% sodium chloride infusion  125 mL/hr IntraVENous CONTINUOUS    sodium chloride (NS) flush 5-40 mL  5-40 mL IntraVENous Q8H    sodium chloride (NS) flush 5-40 mL  5-40 mL IntraVENous PRN    acetaminophen (TYLENOL) tablet 650 mg  650 mg Oral Q6H PRN    Or    acetaminophen (TYLENOL) suppository 650 mg  650 mg Rectal Q6H PRN    polyethylene glycol (MIRALAX) packet 17 g  17 g Oral DAILY PRN            Lab Review:     Recent Labs     12/25/20  0026 12/24/20  1800   WBC 16.4* 15.1*   HGB 17.1* 18.3*   HCT 49.7 52.8*   * 142*     Recent Labs     12/25/20  0714 12/25/20  0232 12/25/20  0026 12/24/20  1800    138 144 137   K 4.4 3.9 2.8* 3.4*    107 116* 103   CO2 29 28 22 27 * 164* 120* 150*   BUN 18 19 15 19   CREA 1.23 1.33* 0.89 1.30   CA 8.6 8.2* 5.9* 9.1   MG 2.4 2.0 1.3* 1.8   ALB 3.4* 3.4* 2.4* 4.2   ALT  --   --  17 28   INR  --   --  1.2* 1.1     No components found for: GLPOC  No results for input(s): PH, PCO2, PO2, HCO3, FIO2 in the last 72 hours.   Recent Labs     12/25/20  0026 12/24/20  1800   INR 1.2* 1.1     No results found for: SDES  Lab Results   Component Value Date/Time    Culture result: NO GROWTH AFTER 10 HOURS 12/24/2020 06:00 PM              ___________________________________________________    Attending Physician: Nghia Patterson MD

## 2020-12-25 NOTE — PROGRESS NOTES
66 yo M w/ hx of kidney stones s/p recent  instrumentation presenting with LUTS, fever, weakness, found to  have SIRS/possible sepsis, pyuria w/o bacteriuria, and elevated troponin. CT a/p showed no acute findings; no hydronephrosis. - SARS-CoV-2 PCR pending; low suspicion for COVID-19. Rapid NAAT test negative  - Empiric cefepime, follow cultures  - Trend cardiac enzymes, check TTE.  Cardiology consult

## 2020-12-26 ENCOUNTER — APPOINTMENT (OUTPATIENT)
Dept: NON INVASIVE DIAGNOSTICS | Age: 75
DRG: 862 | End: 2020-12-26
Attending: INTERNAL MEDICINE
Payer: MEDICARE

## 2020-12-26 ENCOUNTER — APPOINTMENT (OUTPATIENT)
Dept: ULTRASOUND IMAGING | Age: 75
DRG: 862 | End: 2020-12-26
Attending: FAMILY MEDICINE
Payer: MEDICARE

## 2020-12-26 LAB
ALBUMIN SERPL-MCNC: 2.7 G/DL (ref 3.5–5)
ALBUMIN/GLOB SERPL: 0.9 {RATIO} (ref 1.1–2.2)
ALP SERPL-CCNC: 52 U/L (ref 45–117)
ALT SERPL-CCNC: 22 U/L (ref 12–78)
ANION GAP SERPL CALC-SCNC: 5 MMOL/L (ref 5–15)
AST SERPL-CCNC: 19 U/L (ref 15–37)
ATRIAL RATE: 0 BPM
BASOPHILS # BLD: 0.1 K/UL (ref 0–0.1)
BASOPHILS NFR BLD: 0 % (ref 0–1)
BILIRUB SERPL-MCNC: 1.3 MG/DL (ref 0.2–1)
BUN SERPL-MCNC: 17 MG/DL (ref 6–20)
BUN/CREAT SERPL: 16 (ref 12–20)
CALCIUM SERPL-MCNC: 7.5 MG/DL (ref 8.5–10.1)
CALCULATED R AXIS, ECG10: 0 DEGREES
CALCULATED T AXIS, ECG11: 0 DEGREES
CHLORIDE SERPL-SCNC: 108 MMOL/L (ref 97–108)
CO2 SERPL-SCNC: 25 MMOL/L (ref 21–32)
CREAT SERPL-MCNC: 1.08 MG/DL (ref 0.7–1.3)
DIAGNOSIS, 93000: NORMAL
DIFFERENTIAL METHOD BLD: ABNORMAL
ECHO AO ASC DIAM: 4.06 CM
ECHO AO ROOT DIAM: 3.86 CM
ECHO IVC PROX: 1.56 CM
ECHO LA AREA 4C: 13.68 CM2
ECHO LA MAJOR AXIS: 4.33 CM
ECHO LA MINOR AXIS: 1.93 CM
ECHO LA VOL 2C: 49.35 ML (ref 18–58)
ECHO LA VOL 4C: 32.37 ML (ref 18–58)
ECHO LA VOL BP: 45.81 ML (ref 18–58)
ECHO LA VOL/BSA BIPLANE: 20.4 ML/M2 (ref 16–28)
ECHO LA VOLUME INDEX A2C: 21.98 ML/M2 (ref 16–28)
ECHO LA VOLUME INDEX A4C: 14.41 ML/M2 (ref 16–28)
ECHO LV E' LATERAL VELOCITY: 6.77 CENTIMETER/SECOND
ECHO LV E' SEPTAL VELOCITY: 6.77 CENTIMETER/SECOND
ECHO LV INTERNAL DIMENSION DIASTOLIC: 5.88 CM (ref 4.2–5.9)
ECHO LV INTERNAL DIMENSION SYSTOLIC: 3.38 CM
ECHO LV IVSD: 1.5 CM (ref 0.6–1)
ECHO LV MASS 2D: 368.1 G (ref 88–224)
ECHO LV MASS INDEX 2D: 163.9 G/M2 (ref 49–115)
ECHO LV POSTERIOR WALL DIASTOLIC: 1.26 CM (ref 0.6–1)
ECHO LVOT DIAM: 2.32 CM
ECHO LVOT PEAK GRADIENT: 5 MMHG
ECHO LVOT PEAK VELOCITY: 111.78 CM/S
ECHO LVOT SV: 86.2 ML
ECHO LVOT VTI: 20.45 CM
ECHO MV A VELOCITY: 58.03 CENTIMETER/SECOND
ECHO MV AREA PHT: 2.99 CM2
ECHO MV E DECELERATION TIME (DT): 253.82 MS
ECHO MV E VELOCITY: 53.02 CENTIMETER/SECOND
ECHO MV PRESSURE HALF TIME (PHT): 73.61 MS
ECHO PV MAX VELOCITY: 94.14 CM/S
ECHO PV PEAK INSTANTANEOUS GRADIENT SYSTOLIC: 3.54 MMHG
ECHO RV INTERNAL DIMENSION: 3.75 CM
ECHO RV TAPSE: 2.49 CM (ref 1.5–2)
EOSINOPHIL # BLD: 0.1 K/UL (ref 0–0.4)
EOSINOPHIL NFR BLD: 0 % (ref 0–7)
ERYTHROCYTE [DISTWIDTH] IN BLOOD BY AUTOMATED COUNT: 13.5 % (ref 11.5–14.5)
GLOBULIN SER CALC-MCNC: 2.9 G/DL (ref 2–4)
GLUCOSE SERPL-MCNC: 130 MG/DL (ref 65–100)
HCT VFR BLD AUTO: 43.9 % (ref 36.6–50.3)
HGB BLD-MCNC: 14.7 G/DL (ref 12.1–17)
IMM GRANULOCYTES # BLD AUTO: 0.2 K/UL (ref 0–0.04)
IMM GRANULOCYTES NFR BLD AUTO: 1 % (ref 0–0.5)
LA VOL DISK BP: 41.18 ML (ref 18–58)
LVOT MG: 2.05 MMHG
LYMPHOCYTES # BLD: 1.4 K/UL (ref 0.8–3.5)
LYMPHOCYTES NFR BLD: 9 % (ref 12–49)
MAGNESIUM SERPL-MCNC: 2 MG/DL (ref 1.6–2.4)
MCH RBC QN AUTO: 30.6 PG (ref 26–34)
MCHC RBC AUTO-ENTMCNC: 33.5 G/DL (ref 30–36.5)
MCV RBC AUTO: 91.3 FL (ref 80–99)
MONOCYTES # BLD: 1.1 K/UL (ref 0–1)
MONOCYTES NFR BLD: 7 % (ref 5–13)
NEUTS SEG # BLD: 13.8 K/UL (ref 1.8–8)
NEUTS SEG NFR BLD: 83 % (ref 32–75)
NRBC # BLD: 0 K/UL (ref 0–0.01)
NRBC BLD-RTO: 0 PER 100 WBC
PHOSPHATE SERPL-MCNC: 1.6 MG/DL (ref 2.6–4.7)
PLATELET # BLD AUTO: 129 K/UL (ref 150–400)
PMV BLD AUTO: 10.1 FL (ref 8.9–12.9)
POTASSIUM SERPL-SCNC: 3.6 MMOL/L (ref 3.5–5.1)
PROT SERPL-MCNC: 5.6 G/DL (ref 6.4–8.2)
Q-T INTERVAL, ECG07: 0 MS
QRS DURATION, ECG06: 0 MS
QTC CALCULATION (BEZET), ECG08: 0 MS
RBC # BLD AUTO: 4.81 M/UL (ref 4.1–5.7)
SODIUM SERPL-SCNC: 138 MMOL/L (ref 136–145)
VENTRICULAR RATE, ECG03: 0 BPM
WBC # BLD AUTO: 16.6 K/UL (ref 4.1–11.1)

## 2020-12-26 PROCEDURE — 74011250637 HC RX REV CODE- 250/637: Performed by: INTERNAL MEDICINE

## 2020-12-26 PROCEDURE — 65660000000 HC RM CCU STEPDOWN

## 2020-12-26 PROCEDURE — 87040 BLOOD CULTURE FOR BACTERIA: CPT

## 2020-12-26 PROCEDURE — 74011250636 HC RX REV CODE- 250/636: Performed by: FAMILY MEDICINE

## 2020-12-26 PROCEDURE — 80053 COMPREHEN METABOLIC PANEL: CPT

## 2020-12-26 PROCEDURE — 36415 COLL VENOUS BLD VENIPUNCTURE: CPT

## 2020-12-26 PROCEDURE — 74011000250 HC RX REV CODE- 250: Performed by: INTERNAL MEDICINE

## 2020-12-26 PROCEDURE — 74011250636 HC RX REV CODE- 250/636: Performed by: INTERNAL MEDICINE

## 2020-12-26 PROCEDURE — 74011250637 HC RX REV CODE- 250/637: Performed by: FAMILY MEDICINE

## 2020-12-26 PROCEDURE — 84100 ASSAY OF PHOSPHORUS: CPT

## 2020-12-26 PROCEDURE — 76536 US EXAM OF HEAD AND NECK: CPT

## 2020-12-26 PROCEDURE — 83735 ASSAY OF MAGNESIUM: CPT

## 2020-12-26 PROCEDURE — 93306 TTE W/DOPPLER COMPLETE: CPT

## 2020-12-26 PROCEDURE — 85025 COMPLETE CBC W/AUTO DIFF WBC: CPT

## 2020-12-26 PROCEDURE — 93306 TTE W/DOPPLER COMPLETE: CPT | Performed by: INTERNAL MEDICINE

## 2020-12-26 RX ORDER — SODIUM,POTASSIUM PHOSPHATES 280-250MG
2 POWDER IN PACKET (EA) ORAL EVERY 4 HOURS
Status: COMPLETED | OUTPATIENT
Start: 2020-12-26 | End: 2020-12-26

## 2020-12-26 RX ADMIN — Medication 10 ML: at 22:04

## 2020-12-26 RX ADMIN — POTASSIUM & SODIUM PHOSPHATES POWDER PACK 280-160-250 MG 2 PACKET: 280-160-250 PACK at 09:56

## 2020-12-26 RX ADMIN — Medication 10 ML: at 05:33

## 2020-12-26 RX ADMIN — POTASSIUM & SODIUM PHOSPHATES POWDER PACK 280-160-250 MG 2 PACKET: 280-160-250 PACK at 12:00

## 2020-12-26 RX ADMIN — ATORVASTATIN CALCIUM 20 MG: 20 TABLET, FILM COATED ORAL at 22:03

## 2020-12-26 RX ADMIN — SODIUM CHLORIDE 125 ML/HR: 900 INJECTION, SOLUTION INTRAVENOUS at 17:34

## 2020-12-26 RX ADMIN — CEFEPIME HYDROCHLORIDE 2 G: 2 INJECTION, POWDER, FOR SOLUTION INTRAVENOUS at 09:56

## 2020-12-26 RX ADMIN — CEFEPIME HYDROCHLORIDE 2 G: 2 INJECTION, POWDER, FOR SOLUTION INTRAVENOUS at 17:34

## 2020-12-26 RX ADMIN — ACETAMINOPHEN 650 MG: 325 TABLET ORAL at 18:43

## 2020-12-26 RX ADMIN — Medication 10 ML: at 17:38

## 2020-12-26 NOTE — PROGRESS NOTES
Shift Change:    Bedside and Verbal shift change report given to Margo Mancia (oncoming nurse) by April (offgoing nurse). Report included the following information SBAR, Kardex, and Recent Results. Shift Report:    Bedside and Verbal shift change report given to Gaby Lane (oncoming nurse) by Margo Mancia (offgoing nurse). Report included the following information SBAR, Kardex, and Recent Results.

## 2020-12-26 NOTE — PROGRESS NOTES
[facility] Dosing Services: 12/26/2020    Pharmacist Renal Dosing Progress Note     The following medication: Cefepime was automatically dose-adjusted per [facility] P&T Committee Protocol, with respect to renal function. Consult provided for this   67 y.o. , male , for the indication of Sepsis: from UTI resulting in GNR bacteremia . Pt Weight:   Wt Readings from Last 1 Encounters:   12/26/20 98 kg (216 lb 0.8 oz)     Dosage changed to:  2 gm every 8 hours     protocol CrCl > 60    Previously Adjusted Regimen 2 gm every 12 hours   Serum Creatinine Lab Results   Component Value Date/Time    Creatinine 1.08 12/26/2020 04:04 AM       Creatinine Clearance Estimated Creatinine Clearance: 67.7 mL/min (based on SCr of 1.08 mg/dL). BUN Lab Results   Component Value Date/Time    BUN 17 12/26/2020 04:04 AM         Pharmacy to continue to monitor patient daily. Will make dosage adjustments based upon changing renal function. Signed Cristal James.  Contact information:  [phone]

## 2020-12-26 NOTE — PROGRESS NOTES
Shift Change:  Bedside and Verbal shift change report given to Radha Parr RN (oncoming nurse) by Susan Vu RN (offgoing nurse). Report included the following information SBAR, Kardex and Quality Measures. End of Shift Report:  Bedside and Verbal shift change report given to Tom Birmingham (oncoming nurse) by Radha Parr RN (offgoing nurse). Report included the following information SBAR, Kardex and Quality Measures. *This patient was assisted with Intentional Toileting every 2 hours during this shift. Documentation of ambulation and output reflected on Flowsheet.

## 2020-12-26 NOTE — PROGRESS NOTES
Aram Freire [facility] 79  [address] [facility], [address] [place] [address]  [phone]      Medical Progress Note      NAME: Vanessa Rincon   :  [id number]  MRM:  [id number]    Date/Time: 2020        Assessment / Plan:     68 yo M w/ hx of kidney stones s/p recent  instrumentation presenting with LUTS, fever, weakness, found to  have SIRS/possible sepsis, pyuria w/o bacteriuria, and elevated troponin. Sepsis: from UTI resulting in GNR bacteremia. Precipitated by  instrumentation. CT a/p showed no acute findings; no hydronephrosis. Continue IV cefepime pending culture spec/sens. SARS-CoV-2 PCR negative    Type 2 MI: likely from sepsis. No CP. Mildly elevated troponin remaining flat. Evaluated by cardiology who thought no further ischemic testing needed. TTE pending    Hematuria / pyuria: undergoing urology evaluation, s/p cystoscopy. Follow up with urology outpatient. Treat UTI as above    Thrombocytopenia: Unclear baseline. Mild, may be from sepsis/bacteremia. Follow PLT      Total time spent: 35 minutes  Time spent in the care of this patient including reviewing records, discussing with nursing and/or other providers on the treatment team, obtaining history and examining the patient, and discussing treatment plans. Care Plan discussed with: Patient, Nursing Staff and >50% of time spent in counseling and coordination of care    Discussed:  Care Plan and D/C Planning    Prophylaxis:  Lovenox    Disposition:  Home w/Family         Subjective:     Chief Complaint:  Follow up UTI    Chart/notes/labs/studies reviewed, patient examined. Feels fine. Denies CP, SOB. No fevers       Objective:       Vitals:        Last 24hrs VS reviewed since prior progress note.  Most recent are:    Visit Vitals  /65 (BP 1 Location: Right arm, BP Patient Position: At rest)   Pulse 75   Temp 98.5 °F (36.9 °C)   Resp 20   Ht 6' 2\" (1.88 m)   Wt 98 kg (216 lb 0.8 oz)   SpO2 94%   BMI 27.74 kg/m²     SpO2 Readings from Last 6 Encounters:   12/26/20 94%            Intake/Output Summary (Last 24 hours) at 12/26/2020 0756  Last data filed at 12/26/2020 0436  Gross per 24 hour   Intake 4133.33 ml   Output 1 ml   Net 4132.33 ml          Exam:     Physical Exam:    Gen:  Well-developed, well-nourished, in no acute distress. Pleasant. Wife at bedside  HEENT:  Atraumatic, normocephalic. Sclerae nonicteric, hearing intact to voice  Neck:  Supple, without apparent masses. Resp:  No accessory muscle use, CTAB without wheezes, rales, or rhonchi  Card: RRR, without m/r/g. No LE edema. Abd:  +bowel sounds, soft, NTTP, nondistended. No HSM. Neuro: Face symmetric, speech fluent, follows commands appropriately, no focal weakness or numbness noted. Psych:  Alert, oriented x 3.  Good insight     Medications Reviewed: (see below)    Lab Data Reviewed: (see below)    ______________________________________________________________________    Medications:     Current Facility-Administered Medications   Medication Dose Route Frequency    potassium, sodium phosphates (NEUTRA-PHOS) packet 2 Packet  2 Packet Oral Q4H    cefepime (MAXIPIME) 2 g in sterile water (preservative free) 10 mL IV syringe  2 g IntraVENous Q12H    atorvastatin (LIPITOR) tablet 20 mg  20 mg Oral QHS    sodium chloride (NS) flush 5-10 mL  5-10 mL IntraVENous PRN    0.9% sodium chloride infusion  125 mL/hr IntraVENous CONTINUOUS    sodium chloride (NS) flush 5-40 mL  5-40 mL IntraVENous Q8H    sodium chloride (NS) flush 5-40 mL  5-40 mL IntraVENous PRN    acetaminophen (TYLENOL) tablet 650 mg  650 mg Oral Q6H PRN    Or    acetaminophen (TYLENOL) suppository 650 mg  650 mg Rectal Q6H PRN    polyethylene glycol (MIRALAX) packet 17 g  17 g Oral DAILY PRN            Lab Review:     Recent Labs     12/26/20  0404 12/25/20  0026 12/24/20  1800   WBC 16.6* 16.4* 15.1*   HGB 14.7 17.1* 18.3*   HCT 43.9 49.7 52.8*   * 121* 142*     Recent Labs     12/26/20  0404 12/25/20  0714 12/25/20  0232 12/25/20  0026 12/24/20  1800    138 138 144 137   K 3.6 4.4 3.9 2.8* 3.4*    107 107 116* 103   CO2 25 29 28 22 27   * 156* 164* 120* 150*   BUN 17 18 19 15 19   CREA 1.08 1.23 1.33* 0.89 1.30   CA 7.5* 8.6 8.2* 5.9* 9.1   MG 2.0 2.4 2.0 1.3* 1.8   PHOS 1.6*  --   --   --   --    ALB 2.7* 3.4* 3.4* 2.4* 4.2   ALT 22  --   --  17 28   INR  --   --   --  1.2* 1.1     No components found for: GLPOC  No results for input(s): PH, PCO2, PO2, HCO3, FIO2 in the last 72 hours.   Recent Labs     12/25/20  0026 12/24/20  1800   INR 1.2* 1.1     No results found for: SDES  Lab Results   Component Value Date/Time    Culture result: NO GROWTH AFTER 1 HOUR 12/26/2020 04:04 AM    Culture result: GRAM NEGATIVE RODS (A) 12/24/2020 06:49 PM    Culture result: (A) 12/24/2020 06:00 PM     GRAM NEGATIVE RODS GROWING IN 1 OF 4 BOTTLES DRAWN (SITE = [facility])              ___________________________________________________    Attending Physician: Michael Michel MD

## 2020-12-27 VITALS
DIASTOLIC BLOOD PRESSURE: 81 MMHG | HEART RATE: 78 BPM | TEMPERATURE: 98.9 F | OXYGEN SATURATION: 95 % | RESPIRATION RATE: 18 BRPM | SYSTOLIC BLOOD PRESSURE: 145 MMHG | WEIGHT: 214.7 LBS | HEIGHT: 74 IN | BODY MASS INDEX: 27.55 KG/M2

## 2020-12-27 LAB
ALBUMIN SERPL-MCNC: 2.8 G/DL (ref 3.5–5)
ANION GAP SERPL CALC-SCNC: 4 MMOL/L (ref 5–15)
BACTERIA SPEC CULT: ABNORMAL
BASOPHILS # BLD: 0 K/UL (ref 0–0.1)
BASOPHILS NFR BLD: 0 % (ref 0–1)
BUN SERPL-MCNC: 15 MG/DL (ref 6–20)
BUN/CREAT SERPL: 14 (ref 12–20)
CALCIUM SERPL-MCNC: 7.8 MG/DL (ref 8.5–10.1)
CC UR VC: ABNORMAL
CHLORIDE SERPL-SCNC: 111 MMOL/L (ref 97–108)
CO2 SERPL-SCNC: 24 MMOL/L (ref 21–32)
CREAT SERPL-MCNC: 1.04 MG/DL (ref 0.7–1.3)
DIFFERENTIAL METHOD BLD: ABNORMAL
EOSINOPHIL # BLD: 0 K/UL (ref 0–0.4)
EOSINOPHIL NFR BLD: 0 % (ref 0–7)
ERYTHROCYTE [DISTWIDTH] IN BLOOD BY AUTOMATED COUNT: 13.3 % (ref 11.5–14.5)
GLUCOSE SERPL-MCNC: 132 MG/DL (ref 65–100)
HCT VFR BLD AUTO: 47.8 % (ref 36.6–50.3)
HGB BLD-MCNC: 16.1 G/DL (ref 12.1–17)
IMM GRANULOCYTES # BLD AUTO: 0 K/UL
IMM GRANULOCYTES NFR BLD AUTO: 0 %
LYMPHOCYTES # BLD: 0.6 K/UL (ref 0.8–3.5)
LYMPHOCYTES NFR BLD: 7 % (ref 12–49)
MAGNESIUM SERPL-MCNC: 2.2 MG/DL (ref 1.6–2.4)
MCH RBC QN AUTO: 30.7 PG (ref 26–34)
MCHC RBC AUTO-ENTMCNC: 33.7 G/DL (ref 30–36.5)
MCV RBC AUTO: 91 FL (ref 80–99)
MONOCYTES # BLD: 0.5 K/UL (ref 0–1)
MONOCYTES NFR BLD: 6 % (ref 5–13)
NEUTS BAND NFR BLD MANUAL: 14 % (ref 0–6)
NEUTS SEG # BLD: 7 K/UL (ref 1.8–8)
NEUTS SEG NFR BLD: 73 % (ref 32–75)
NRBC # BLD: 0 K/UL (ref 0–0.01)
NRBC BLD-RTO: 0 PER 100 WBC
PHOSPHATE SERPL-MCNC: 1.8 MG/DL (ref 2.6–4.7)
PLATELET # BLD AUTO: 110 K/UL (ref 150–400)
PLATELET COMMENTS,PCOM: ABNORMAL
PMV BLD AUTO: 9.9 FL (ref 8.9–12.9)
POTASSIUM SERPL-SCNC: 3.9 MMOL/L (ref 3.5–5.1)
RBC # BLD AUTO: 5.25 M/UL (ref 4.1–5.7)
RBC MORPH BLD: ABNORMAL
SERVICE CMNT-IMP: ABNORMAL
SODIUM SERPL-SCNC: 139 MMOL/L (ref 136–145)
WBC # BLD AUTO: 8.1 K/UL (ref 4.1–11.1)

## 2020-12-27 PROCEDURE — 74011000250 HC RX REV CODE- 250: Performed by: INTERNAL MEDICINE

## 2020-12-27 PROCEDURE — 80069 RENAL FUNCTION PANEL: CPT

## 2020-12-27 PROCEDURE — 83735 ASSAY OF MAGNESIUM: CPT

## 2020-12-27 PROCEDURE — 36415 COLL VENOUS BLD VENIPUNCTURE: CPT

## 2020-12-27 PROCEDURE — 74011250637 HC RX REV CODE- 250/637: Performed by: INTERNAL MEDICINE

## 2020-12-27 PROCEDURE — 74011250636 HC RX REV CODE- 250/636: Performed by: FAMILY MEDICINE

## 2020-12-27 PROCEDURE — 85025 COMPLETE CBC W/AUTO DIFF WBC: CPT

## 2020-12-27 PROCEDURE — 74011250636 HC RX REV CODE- 250/636: Performed by: INTERNAL MEDICINE

## 2020-12-27 RX ORDER — SODIUM,POTASSIUM PHOSPHATES 280-250MG
2 POWDER IN PACKET (EA) ORAL EVERY 4 HOURS
Status: DISCONTINUED | OUTPATIENT
Start: 2020-12-27 | End: 2020-12-27 | Stop reason: HOSPADM

## 2020-12-27 RX ORDER — LEVOFLOXACIN 750 MG/1
750 TABLET ORAL DAILY
Qty: 7 TAB | Refills: 0 | Status: SHIPPED | OUTPATIENT
Start: 2020-12-27 | End: 2021-01-03

## 2020-12-27 RX ADMIN — CEFEPIME HYDROCHLORIDE 2 G: 2 INJECTION, POWDER, FOR SOLUTION INTRAVENOUS at 02:28

## 2020-12-27 RX ADMIN — POTASSIUM & SODIUM PHOSPHATES POWDER PACK 280-160-250 MG 2 PACKET: 280-160-250 PACK at 09:19

## 2020-12-27 RX ADMIN — Medication 10 ML: at 09:21

## 2020-12-27 RX ADMIN — CEFEPIME HYDROCHLORIDE 2 G: 2 INJECTION, POWDER, FOR SOLUTION INTRAVENOUS at 09:20

## 2020-12-27 RX ADMIN — SODIUM CHLORIDE 125 ML/HR: 900 INJECTION, SOLUTION INTRAVENOUS at 02:27

## 2020-12-27 NOTE — PROGRESS NOTES
12/27/2020  Case Management Note    10:03 AM  Noted patient has discharge order--Spoke with patient, confirmed demographics, wife will be transporting him home. He does not have any concerns for discharge and there are no discharge needs identified. He is OK to discharge from CM standpoint.      Severo Day BS

## 2020-12-27 NOTE — DISCHARGE SUMMARY
Physician Discharge Summary     Patient ID:  Carleen Schmitz  [id number]  89 y.o.  1945    Admit date: 12/24/2020    Discharge date: 12/27/2020    Admission Diagnoses: MI (myocardial infarction) ([facility] [I21.9]  Sepsis ([facility] [A41.9]  Fever [R50.9]  Tachycardia [R00.0]  Leukocytosis [D72.829]  UTI (urinary tract infection) [N39.0]  Hematuria [R31.9]    Principal Discharge Diagnoses:    Sepsis  UTI  Bacteremia      OTHER PROBLEMS ADDRESSEDS  Principal Diagnosis <principal problem not specified>                                            Active Problems:    Leukocytosis (12/24/2020)      UTI (urinary tract infection) (12/24/2020)      Tachycardia (12/24/2020)      MI (myocardial infarction) ([facility] (12/24/2020)      Hematuria (12/24/2020)      Fever (12/24/2020)      Sepsis ([facility] (12/24/2020)       Patient Active Problem List   Diagnosis Code    Leukocytosis D72.829    UTI (urinary tract infection) N39.0    Tachycardia R00.0    MI (myocardial infarction) ([facility] I21.9    Hematuria R31.9    Fever R50.9    Sepsis [facility]) A41.9         Hospital Course:   Mr. Marissa Bravo is a 66 yo M w/ hx of kidney stones s/p recent  instrumentation presenting with LUTS, fever, weakness, found to have sepsis, UTI, and bacteremia     Sepsis: from UTI resulting in E. coli bacteremia. Precipitated by  instrumentation. CT a/p showed no acute findings; no hydronephrosis. Treated with IV cefepime. DC home on PO Levaquin. QTc 438     Type 2 MI: likely from sepsis. No CP. Mildly elevated troponin remaining flat. Evaluated by cardiology who thought no further ischemic testing needed. TTE showed preserved LVEF w/o RWMA     Hematuria / pyuria: undergoing urology evaluation, s/p cystoscopy. Follow up with urology outpatient. Treating UTI as above     Thrombocytopenia: Unclear baseline. Mild, may be from sepsis/bacteremia. Has remained stable. Advised follow up outpatient     Pt discharged in improved and stable condition.      Procedures performed: see above    Imaging studies: see above  Cta Chest W Or W Wo Cont    Result Date: 12/24/2020  IMPRESSION: No acute process or evidence of pulmonary embolism. Ct Abd Pelv Wo Cont    Result Date: 12/24/2020  IMPRESSION: No acute findings. Enlarged prostate. Us Thyroid/parathyroid/soft Tiss    Result Date: 12/26/2020  IMPRESSION: 1. No definite sonographic correlate to the CT scan abnormality. Of note, sonographic imaging of the inferior right thyroid lobe is difficult as it is deep to the clavicle. 2. Benign-appearing left thyroid nodule measuring 1.1 cm. Xr Chest Port    Result Date: 12/24/2020  Impression: No acute process. Thyroid US:   1. No definite sonographic correlate to the CT scan abnormality. Of note,  sonographic imaging of the inferior right thyroid lobe is difficult as it is  deep to the clavicle. 2. Benign-appearing left thyroid nodule measuring 1.1 cm.     TTE  Result status: Final result   · LV: Calculated LVEF is 65%. Visually measured ejection fraction. Normal cavity size and systolic function (ejection fraction normal). Moderate concentric hypertrophy. Wall motion: normal. Mild (grade 1) left ventricular diastolic dysfunction. · MV: Mitral valve thickening. Mitral valve leaflet calcification. PCP: Chelsea Tang MD    Consults: Cardiology    Discharge Exam:  Patient Vitals for the past 12 hrs:   Temp Pulse Resp BP SpO2   12/27/20 0838 98.9 °F (37.2 °C) 78 18 (!) 145/81 95 %   12/27/20 0659  87      12/27/20 0416 99.5 °F (37.5 °C) 83 16 137/84 95 %   12/26/20 2328 98.7 °F (37.1 °C) 77 18 (!) 156/61 96 %     GEN: NAD  CV: RRR  RESP: CTAB  ABD: NTTP    Disposition: home    Patient Instructions:   Current Discharge Medication List      START taking these medications    Details   levoFLOXacin (Levaquin) 750 mg tablet Take 1 Tab by mouth daily for 7 days.   Qty: 7 Tab, Refills: 0         CONTINUE these medications which have NOT CHANGED    Details   simvastatin (ZOCOR) 40 mg tablet Take 40 mg by mouth nightly. Activity: See discharge instructions  Diet: See discharge instructions  Wound Care: See discharge instructions    Follow-up Information     Follow up With Specialties Details Why Contact Info    Latonia Warren MD Family Medicine In 1 week  [address] [place] [address]  [facility] [address]  [phone]      [facility] urology              I spent 35 minutes on this discharge. Signed:  Rubi Brown MD  12/27/2020  8:00 AM    CC: PCP Dr. Adams Wilcox, [facility] urology   .

## 2020-12-27 NOTE — DISCHARGE INSTRUCTIONS
HOSPITALIST DISCHARGE INSTRUCTIONS  NAME: Kathryn Nj   :     MRN:  [id number]     Date/Time:  2020 7:59 AM    ADMIT DATE: 2020     DISCHARGE DATE: 2020     PRINCIPAL DISCHARGE DIAGNOSES:  Bacteremia     MEDICATIONS:  · It is important that you take the medication exactly as they are prescribed. Note the changes and additions to your medications. Be sure you understand these changes before you are discharged today. · Keep your medication in the bottles provided by the pharmacist and keep a list of the medication names, dosages, and times to be taken in your wallet. · Do not take other medications without consulting your doctor. Pain Management: per above medications    What to do at Home    Recommended diet:  Resume previous diet    Recommended activity: Activity as tolerated    If you experience any of the following symptoms then please call your primary care physician or return to the emergency room if you cannot get hold of your doctor:  Fever, chills, severe abdominal pain, nausea, vomiting, diarrhea, urinary problems, confusion, weakness, severe chest pain, shortness of breath, or other severe concerning symptoms    Follow Up: Follow-up Information     Follow up With Specialties Details Why Contact Info    Sera Price MD Family Medicine In 1 week  [address] [place] [address] [phone] [address] [facility] urology            Repeat CBC (complete blood count) in 1 week with primary care physician (for borderline low platelets, which may be associated with infections)    Information obtained by :  I understand that if any problems occur once I am at home I am to contact my physician. I understand and acknowledge receipt of the instructions indicated above.                                                                                                                                            Physician's or R.N.'s Signature Date/Time                                                                                                                                              Patient or Representative Signature                                                          Date/Time

## 2020-12-27 NOTE — PROGRESS NOTES
0720 Bedside and Verbal shift change report given to Debby Luu RN (oncoming nurse) by Zack Buck RN (offgoing nurse). Report included the following information SBAR and Kardex. This patient was assisted with Intentional Toileting every 2 hours during this shift. Documentation of ambulation and output reflected on Flowsheet. 5953 Patient and wife reviewed discharge instructions, verbalized understanding, no questions. Unable to esign, signed paper copy and copy placed in chart. Patient left with script and belongings.

## 2020-12-27 NOTE — PROGRESS NOTES
Disaster charting in effect. Bedside and Verbal shift change report given to Colonel Devante RN (oncoming nurse) by Eunice Dakins, RN (offgoing nurse). Report included the following information SBAR, Kardex, Intake/Output, MAR, Recent Results, Med Rec Status and Cardiac Rhythm NSR to sinus thelma. 0730 Bedside and Verbal shift change report given to LUISA Guardado (oncoming nurse) by Colonel Devante RN (offgoing nurse). Report included the following information SBAR, Kardex, Intake/Output, MAR, Recent Results, Med Rec Status and Cardiac Rhythm NSR.

## 2020-12-28 ENCOUNTER — PATIENT OUTREACH (OUTPATIENT)
Dept: CASE MANAGEMENT | Age: 75
End: 2020-12-28

## 2020-12-28 LAB
ATRIAL RATE: 119 BPM
CALCULATED P AXIS, ECG09: 44 DEGREES
CALCULATED R AXIS, ECG10: 53 DEGREES
CALCULATED T AXIS, ECG11: 109 DEGREES
DIAGNOSIS, 93000: NORMAL
P-R INTERVAL, ECG05: 192 MS
Q-T INTERVAL, ECG07: 316 MS
QRS DURATION, ECG06: 100 MS
QTC CALCULATION (BEZET), ECG08: 444 MS
VENTRICULAR RATE, ECG03: 119 BPM

## 2020-12-29 ENCOUNTER — PATIENT OUTREACH (OUTPATIENT)
Dept: CASE MANAGEMENT | Age: 75
End: 2020-12-29

## 2020-12-31 LAB
BACTERIA SPEC CULT: ABNORMAL
BACTERIA SPEC CULT: ABNORMAL
BACTERIA SPEC CULT: NORMAL
SERVICE CMNT-IMP: ABNORMAL
SERVICE CMNT-IMP: NORMAL

## 2021-01-02 NOTE — PROGRESS NOTES
Physician Progress Note      PATIENT:               Chuy Martins  CSN #:                  [id number]  :                       1945  ADMIT DATE:       2020 5:18 PM  [address] DATE:        2020 12:00 PM  RESPONDING  PROVIDER #:        Clare Pizarro MD          QUERY TEXT:    Dear Hospitalist Team,  Pt admitted with Sepsis 2/2 UTI. Pt noted to have recent urology procedure . If possible, please document in progress notes and discharge summary:    The medical record reflects the following:    Risk Factors: [address] M admitted with Sepsis 2/2 UTI; recent cystoscopy with Urology on     Clinical Indicators: Patient arrived to the ED after a cystoscopy one day prior with c/o fatigue, urinary symptoms and fever. Work up in the ED revealed WBC 15.1, Temperature 100.7,  and RR 35. UA abnormal and UC sent with positive culture for E. Coli. BC also positive 1/4 bottle with E.coli. Per progress notes states, Sepsis: from UTI resulting in E. coli bacteremia. Precipitated by  instrumentation. Patient was treated with 1,000 ML NS IVF bolus then NS IVF at 125 ML/HR, Vancomycin 2,500 mg IV once, Rocephin 1 G IV once then Q 24 hrs and Maxipime 2 G IV Q 12 hrs. Treatment: Daily CBC/BMP, UA/UC, BC, frequent monitoring/vital signs and 1,000 ML NS IVF bolus then NS IVF at 125 ML/HR, Vancomycin 2,500 mg IV once, Rocephin 1 G IV once then Q 24 hrs and Maxipime 2 G IV Q 12 hrs. Thank you,  Sara Ahumada RN, [facility]  [phone]  Options provided:  -- Sepsis due to UTI as an postoperative complication  -- Sepsis due to UTI as an expected/inherent condition that occurred postoperatively and not a complication  -- Sepsis due to UTI  related to other incidental risk factor (please specify) occurring following surgery and not a complication, Please document incidental risk factor.   -- Other - I will add my own diagnosis  -- Disagree - Not applicable / Not valid  -- Disagree - Clinically unable to determine / Unknown  -- Refer to Clinical Documentation Reviewer    PROVIDER RESPONSE TEXT:    Patient has Sepsis due to UTI as a postoperative complication. Query created by:  Michell De Leon on 12/31/2020 12:30 PM      Electronically signed by:  Soraida Patten MD 1/2/2021 1:21 PM

## 2022-03-18 PROBLEM — N39.0 UTI (URINARY TRACT INFECTION): Status: ACTIVE | Noted: 2020-12-24

## 2022-03-18 PROBLEM — I21.9 MI (MYOCARDIAL INFARCTION) (HCC): Status: ACTIVE | Noted: 2020-12-24

## 2022-03-19 PROBLEM — R31.9 HEMATURIA: Status: ACTIVE | Noted: 2020-12-24

## 2022-03-19 PROBLEM — R00.0 TACHYCARDIA: Status: ACTIVE | Noted: 2020-12-24

## 2022-03-19 PROBLEM — R50.9 FEVER: Status: ACTIVE | Noted: 2020-12-24

## 2022-03-19 PROBLEM — D72.829 LEUKOCYTOSIS: Status: ACTIVE | Noted: 2020-12-24

## 2022-03-19 PROBLEM — A41.9 SEPSIS (HCC): Status: ACTIVE | Noted: 2020-12-24

## 2022-08-03 ENCOUNTER — TRANSCRIBE ORDER (OUTPATIENT)
Dept: SCHEDULING | Age: 77
End: 2022-08-03

## 2022-08-03 DIAGNOSIS — I42.2 PRIMARY HYPERTROPHIC CARDIOMYOPATHY (HCC): Primary | ICD-10-CM

## 2022-08-03 DIAGNOSIS — I50.1 LEFT HEART FAILURE (HCC): ICD-10-CM

## 2022-11-14 ENCOUNTER — HOSPITAL ENCOUNTER (OUTPATIENT)
Dept: MRI IMAGING | Age: 77
Discharge: HOME | End: 2022-11-14
Attending: SPECIALIST
Payer: MEDICARE

## 2022-11-14 VITALS — BODY MASS INDEX: 26.32 KG/M2 | WEIGHT: 205 LBS

## 2022-11-14 DIAGNOSIS — I42.2 PRIMARY HYPERTROPHIC CARDIOMYOPATHY (HCC): ICD-10-CM

## 2022-11-14 DIAGNOSIS — I50.1 LEFT HEART FAILURE (HCC): ICD-10-CM

## 2022-11-14 PROCEDURE — 75561 CARDIAC MRI FOR MORPH W/DYE: CPT

## 2022-11-14 PROCEDURE — 77030021566

## 2022-11-14 PROCEDURE — 75561 CARDIAC MRI FOR MORPH W/DYE: CPT | Performed by: INTERNAL MEDICINE

## 2022-11-14 PROCEDURE — A9576 INJ PROHANCE MULTIPACK: HCPCS | Performed by: INTERNAL MEDICINE

## 2022-11-14 PROCEDURE — 74011250636 HC RX REV CODE- 250/636: Performed by: INTERNAL MEDICINE

## 2022-11-14 RX ADMIN — GADOTERIDOL 27 ML: 279.3 INJECTION, SOLUTION INTRAVENOUS at 10:02

## 2023-03-24 ENCOUNTER — TRANSCRIBE ORDER (OUTPATIENT)
Dept: SCHEDULING | Age: 78
End: 2023-03-24

## 2023-03-24 DIAGNOSIS — M12.812 ROTATOR CUFF TEAR ARTHROPATHY OF LEFT SHOULDER: Primary | ICD-10-CM

## 2023-03-24 DIAGNOSIS — M75.102 ROTATOR CUFF TEAR ARTHROPATHY OF LEFT SHOULDER: Primary | ICD-10-CM

## 2023-04-21 ENCOUNTER — HOSPITAL ENCOUNTER (OUTPATIENT)
Dept: CT IMAGING | Age: 78
Discharge: HOME | End: 2023-04-21
Attending: ORTHOPAEDIC SURGERY
Payer: MEDICARE

## 2023-04-21 ENCOUNTER — HOSPITAL ENCOUNTER (EMERGENCY)
Age: 78
Discharge: HOME | End: 2023-04-21
Attending: EMERGENCY MEDICINE
Payer: MEDICARE

## 2023-04-21 VITALS
SYSTOLIC BLOOD PRESSURE: 130 MMHG | HEART RATE: 73 BPM | WEIGHT: 204 LBS | BODY MASS INDEX: 26.18 KG/M2 | OXYGEN SATURATION: 96 % | DIASTOLIC BLOOD PRESSURE: 69 MMHG | RESPIRATION RATE: 20 BRPM | TEMPERATURE: 98.6 F | HEIGHT: 74 IN

## 2023-04-21 DIAGNOSIS — L98.9 SKIN LESION: Primary | ICD-10-CM

## 2023-04-21 DIAGNOSIS — M12.812 ROTATOR CUFF TEAR ARTHROPATHY OF LEFT SHOULDER: ICD-10-CM

## 2023-04-21 DIAGNOSIS — M75.102 ROTATOR CUFF TEAR ARTHROPATHY OF LEFT SHOULDER: ICD-10-CM

## 2023-04-21 PROCEDURE — 99282 EMERGENCY DEPT VISIT SF MDM: CPT

## 2023-04-21 PROCEDURE — 73200 CT UPPER EXTREMITY W/O DYE: CPT

## 2023-04-21 NOTE — DISCHARGE INSTRUCTIONS
Please call your dermatologist today for follow-up as soon as possible. Your lesion is concerning for potential melanoma or other skin cancer. This typically requires a biopsy and excision. Thank you for allowing us to provide you with medical care today. We realize that you have many choices for your emergency care needs. We thank you for choosing [facility]. Please choose us in the future for any continued health care needs. We hope we addressed all of your medical concerns. We strive to provide excellent quality care in the Emergency Department. Anything less than excellent does not meet our expectations. The exam and treatment you received in the Emergency Department were for an emergent problem and are not intended as complete care. It is important that you follow up with a doctor, nurse practitioner, or physician's assistant for ongoing care. If your symptoms worsen or you do not improve as expected and you are unable to reach your usual health care provider, you should return to the Emergency Department. We are available 24 hours a day. Take this sheet with you when you go to your follow-up visit. If you have any problem arranging the follow-up visit, contact the Emergency Department immediately. Make an appointment your family doctor for follow up of this visit. Return to the ER if you are unable to be seen in a timely manner.

## 2023-04-21 NOTE — ED TRIAGE NOTES
Patient states that he thinks that he has a tick in his right groin area. Patient states that it began itching last night. Patient denies being outside for prolong amounts of time.

## 2023-04-21 NOTE — ED PROVIDER NOTES
70-year-old male without any pertinent medical history presents to the emergency department concern for an itchy lesion at his right groin, he is concerned about having a tick. He noticed it this morning. No other areas of concern. The history is provided by the patient and medical records. Other  This is a new problem. History reviewed. No pertinent past medical history. No past surgical history on file. History reviewed. No pertinent family history. Social History     Socioeconomic History    Marital status:      Spouse name: Not on file    Number of children: Not on file    Years of education: Not on file    Highest education level: Not on file   Occupational History    Not on file   Tobacco Use    Smoking status: Not on file    Smokeless tobacco: Not on file   Substance and Sexual Activity    Alcohol use: Not on file    Drug use: Not on file    Sexual activity: Not on file   Other Topics Concern    Not on file   Social History Narrative    Not on file     Social Determinants of Health     Financial Resource Strain: Not on file   Food Insecurity: Not on file   Transportation Needs: Not on file   Physical Activity: Not on file   Stress: Not on file   Social Connections: Not on file   Intimate Partner Violence: Not on file   Housing Stability: Not on file         ALLERGIES: Patient has no known allergies. Review of Systems    Vitals:    04/21/23 0646   BP: 130/69   Pulse: 73   Resp: 20   Temp: 98.6 °F (37 °C)   SpO2: 96%   Weight: 92.5 kg (204 lb)   Height: 6' 2\" (1.88 m)            Physical Exam  Vitals and nursing note reviewed. Constitutional:       General: He is not in acute distress. Appearance: Normal appearance. He is not ill-appearing. Skin:     Comments: There is a lesion in his right groin which is brown, slightly raised and approximately a centimeter in diameter. Neurological:      Mental Status: He is alert.         Medical Decision Making  70-year-old male presents as above with a lesion in his right groin which is concerning to me for potential melanoma. I see no tick. Will refer to dermatology. He already sees a dermatologist in [facility].            Procedures

## 2024-04-17 ENCOUNTER — HOSPITAL ENCOUNTER (EMERGENCY)
Facility: HOSPITAL | Age: 79
Discharge: HOME | End: 2024-04-17
Attending: STUDENT IN AN ORGANIZED HEALTH CARE EDUCATION/TRAINING PROGRAM
Payer: MEDICARE

## 2024-04-17 ENCOUNTER — APPOINTMENT (OUTPATIENT)
Facility: HOSPITAL | Age: 79
End: 2024-04-17
Payer: MEDICARE

## 2024-04-17 VITALS
SYSTOLIC BLOOD PRESSURE: 124 MMHG | TEMPERATURE: 99.5 F | BODY MASS INDEX: 25.67 KG/M2 | HEART RATE: 80 BPM | HEIGHT: 74 IN | RESPIRATION RATE: 18 BRPM | WEIGHT: 200 LBS | OXYGEN SATURATION: 94 % | DIASTOLIC BLOOD PRESSURE: 74 MMHG

## 2024-04-17 DIAGNOSIS — J40 BRONCHITIS: Primary | ICD-10-CM

## 2024-04-17 PROCEDURE — 93005 ELECTROCARDIOGRAM TRACING: CPT | Performed by: STUDENT IN AN ORGANIZED HEALTH CARE EDUCATION/TRAINING PROGRAM

## 2024-04-17 PROCEDURE — 99284 EMERGENCY DEPT VISIT MOD MDM: CPT

## 2024-04-17 PROCEDURE — 71046 X-RAY EXAM CHEST 2 VIEWS: CPT

## 2024-04-17 RX ORDER — ACETAMINOPHEN 500 MG
1000 TABLET ORAL ONCE
Status: DISCONTINUED | OUTPATIENT
Start: 2024-04-17 | End: 2024-04-17

## 2024-04-17 ASSESSMENT — LIFESTYLE VARIABLES
HOW OFTEN DO YOU HAVE A DRINK CONTAINING ALCOHOL: NEVER
HOW MANY STANDARD DRINKS CONTAINING ALCOHOL DO YOU HAVE ON A TYPICAL DAY: PATIENT DOES NOT DRINK

## 2024-04-17 ASSESSMENT — PAIN - FUNCTIONAL ASSESSMENT: PAIN_FUNCTIONAL_ASSESSMENT: NONE - DENIES PAIN

## 2024-04-17 NOTE — ED NOTES
Bedside and Verbal shift change report given to Elizabeth  (oncoming nurse) by Oly (offgoing nurse). Report included the following information Nurse Handoff Report, ED Encounter Summary, ED SBAR, MAR, and Recent Results.

## 2024-04-17 NOTE — ED TRIAGE NOTES
Pt ambulated to ED.  Pt reports possibly having an URI for the past 7 days.  Pt reports having a worsening cough and phlegm.  Pt reports no CP, but a lot of congestion with deep coughs.  Pt started to take amoxicillin on 4/15 from a \" doctor appt.\"

## 2024-04-18 LAB
EKG ATRIAL RATE: 80 BPM
EKG DIAGNOSIS: NORMAL
EKG P AXIS: 72 DEGREES
EKG P-R INTERVAL: 166 MS
EKG Q-T INTERVAL: 402 MS
EKG QRS DURATION: 96 MS
EKG QTC CALCULATION (BAZETT): 463 MS
EKG R AXIS: 64 DEGREES
EKG T AXIS: 217 DEGREES
EKG VENTRICULAR RATE: 80 BPM

## 2024-04-18 PROCEDURE — 93010 ELECTROCARDIOGRAM REPORT: CPT | Performed by: SPECIALIST

## 2024-04-22 NOTE — ED PROVIDER NOTES
[facility] EMERGENCY DEPT  EMERGENCY DEPARTMENT ENCOUNTER      Pt Name: Jamin Pan  MRN: [id number]  Birthdate 1945  Date of evaluation: 4/17/2024  Provider: Анна Woods DO    CHIEF COMPLAINT       Chief Complaint   Patient presents with    Cough       PMH No past medical history on file.      MDM:   Vitals:    Vitals:    04/17/24 1751   BP: 124/74   Pulse: 80   Resp: 18   Temp: 99.5 °F (37.5 °C)   SpO2: 94%           This is a 78 y.o. male with pmhx HLD who presents today for cc of cough.  Patient states that over the last week or so he has had some congestion, postnasal drip.  No fever or chills.  He just has noticed that now he is having a bit of a cough, productive of a bit of sputum.  He denies any chest pain or dyspnea.  He went to see a physician who prescribed amoxicillin for presumed sinusitis.  He has also been taking some Mucinex which seems to make the cough a little bit worse.  He states \"I was told that I have the lungs of a 30-year-old\" and would like to keep them as youthful as possible and hoping to find out today if he has any development of pneumonia since his last exam.     On arrival VS stable.   Physical Exam  General: Alert, no acute distress, appearing younger than stated age  HEENT: Normocephalic, atraumatic. EOMI, moist oral mucosa, no conjunctival injection  Neck: ROM normal, supple  Cardio: Heart regular rate and rhythm, cap refill <2seconds  Lungs: No respiratory distress, no wheezing, CTAB  MSK: ROM normal, no LE edema  Skin: Warm, dry, no rash  Neuro: No focal neurodeficits, Aox3    Well-appearing patient, EKG nonischemic.  Chest x-ray clear.    Patient given nebulized saline treatment for cough/congestion.  On reevaluation reported improvement in symptoms.  Discussed outpatient management including humidified air, tea with honey.  I do suspect the Mucinex is drying him out a little bit too much so I recommended cessation for now.  I opted not to add on any more

## 2024-07-19 ENCOUNTER — APPOINTMENT (OUTPATIENT)
Facility: HOSPITAL | Age: 79
End: 2024-07-19
Payer: MEDICARE

## 2024-07-19 ENCOUNTER — HOSPITAL ENCOUNTER (EMERGENCY)
Facility: HOSPITAL | Age: 79
Discharge: HOME | End: 2024-07-19
Attending: STUDENT IN AN ORGANIZED HEALTH CARE EDUCATION/TRAINING PROGRAM
Payer: MEDICARE

## 2024-07-19 VITALS
BODY MASS INDEX: 26.32 KG/M2 | HEART RATE: 66 BPM | WEIGHT: 205 LBS | SYSTOLIC BLOOD PRESSURE: 128 MMHG | RESPIRATION RATE: 14 BRPM | DIASTOLIC BLOOD PRESSURE: 80 MMHG | OXYGEN SATURATION: 97 % | TEMPERATURE: 98 F

## 2024-07-19 DIAGNOSIS — N20.0 NEPHROLITHIASIS: Primary | ICD-10-CM

## 2024-07-19 LAB
ALBUMIN SERPL-MCNC: 4.3 G/DL (ref 3.5–5.2)
ALBUMIN/GLOB SERPL: 1.7 (ref 1.1–2.2)
ALP SERPL-CCNC: 56 U/L (ref 40–129)
ALT SERPL-CCNC: 11 U/L (ref 10–50)
ANION GAP SERPL CALC-SCNC: 10 MMOL/L (ref 5–15)
APPEARANCE UR: CLEAR
AST SERPL-CCNC: 25 U/L (ref 10–50)
BACTERIA URNS QL MICRO: NEGATIVE /HPF
BASOPHILS # BLD: 0.1 K/UL (ref 0–1)
BASOPHILS NFR BLD: 1 % (ref 0–1)
BILIRUB SERPL-MCNC: 0.7 MG/DL (ref 0.2–1)
BILIRUB UR QL: NEGATIVE
BUN SERPL-MCNC: 29 MG/DL (ref 8–23)
BUN/CREAT SERPL: 24 (ref 12–20)
CALCIUM SERPL-MCNC: 9.2 MG/DL (ref 8.8–10.2)
CHLORIDE SERPL-SCNC: 107 MMOL/L (ref 98–107)
CO2 SERPL-SCNC: 27 MMOL/L (ref 22–29)
COLOR UR: ABNORMAL
CREAT SERPL-MCNC: 1.19 MG/DL (ref 0.7–1.2)
DIFFERENTIAL METHOD BLD: ABNORMAL
EOSINOPHIL # BLD: 0.3 K/UL (ref 0–0.4)
EOSINOPHIL NFR BLD: 3 % (ref 0–7)
EPITH CASTS URNS QL MICRO: ABNORMAL /LPF
ERYTHROCYTE [DISTWIDTH] IN BLOOD BY AUTOMATED COUNT: 13.4 % (ref 11.5–14.5)
GLOBULIN SER CALC-MCNC: 2.5 G/DL (ref 2–4)
GLUCOSE SERPL-MCNC: 153 MG/DL (ref 65–100)
GLUCOSE UR STRIP.AUTO-MCNC: NEGATIVE MG/DL
HCT VFR BLD AUTO: 51 % (ref 36.6–50.3)
HGB BLD-MCNC: 17.2 G/DL (ref 12.1–17)
HGB UR QL STRIP: ABNORMAL
IMM GRANULOCYTES # BLD AUTO: 0.1 K/UL (ref 0–0.04)
IMM GRANULOCYTES NFR BLD AUTO: 1 % (ref 0–0.5)
KETONES UR QL STRIP.AUTO: NEGATIVE MG/DL
LEUKOCYTE ESTERASE UR QL STRIP.AUTO: NEGATIVE
LIPASE SERPL-CCNC: 31 U/L (ref 13–60)
LYMPHOCYTES # BLD: 1.4 K/UL (ref 0.8–3.5)
LYMPHOCYTES NFR BLD: 18 % (ref 12–49)
MCH RBC QN AUTO: 30.7 PG (ref 26–34)
MCHC RBC AUTO-ENTMCNC: 33.7 G/DL (ref 30–36.5)
MCV RBC AUTO: 91.1 FL (ref 80–99)
MONOCYTES # BLD: 0.6 K/UL (ref 0–1)
MONOCYTES NFR BLD: 8 % (ref 5–13)
NEUTS SEG # BLD: 5.7 K/UL (ref 1.8–8)
NEUTS SEG NFR BLD: 69 % (ref 32–75)
NITRITE UR QL STRIP.AUTO: NEGATIVE
NRBC # BLD: 0 K/UL (ref 0–0.01)
NRBC BLD-RTO: 0 PER 100 WBC
PH UR STRIP: 5.5 (ref 5–8)
PLATELET # BLD AUTO: 181 K/UL (ref 150–400)
PMV BLD AUTO: 9.5 FL (ref 8.9–12.9)
POTASSIUM SERPL-SCNC: 4.5 MMOL/L (ref 3.5–5.1)
PROT SERPL-MCNC: 6.8 G/DL (ref 6.4–8.3)
PROT UR STRIP-MCNC: NEGATIVE MG/DL
RBC # BLD AUTO: 5.6 M/UL (ref 4.1–5.7)
RBC #/AREA URNS HPF: ABNORMAL /HPF (ref 0–5)
SODIUM SERPL-SCNC: 144 MMOL/L (ref 136–145)
SP GR UR REFRACTOMETRY: 1.02 (ref 1–1.03)
URINE CULTURE IF INDICATED: ABNORMAL
UROBILINOGEN UR QL STRIP.AUTO: 0.2 EU/DL (ref 0.2–1)
WBC # BLD AUTO: 8.1 K/UL (ref 4.1–11.1)
WBC URNS QL MICRO: ABNORMAL /HPF (ref 0–4)

## 2024-07-19 PROCEDURE — 96374 THER/PROPH/DIAG INJ IV PUSH: CPT

## 2024-07-19 PROCEDURE — 6360000004 HC RX CONTRAST MEDICATION: Performed by: STUDENT IN AN ORGANIZED HEALTH CARE EDUCATION/TRAINING PROGRAM

## 2024-07-19 PROCEDURE — 99285 EMERGENCY DEPT VISIT HI MDM: CPT

## 2024-07-19 PROCEDURE — 6360000002 HC RX W HCPCS: Performed by: STUDENT IN AN ORGANIZED HEALTH CARE EDUCATION/TRAINING PROGRAM

## 2024-07-19 PROCEDURE — 36415 COLL VENOUS BLD VENIPUNCTURE: CPT

## 2024-07-19 PROCEDURE — 6370000000 HC RX 637 (ALT 250 FOR IP): Performed by: STUDENT IN AN ORGANIZED HEALTH CARE EDUCATION/TRAINING PROGRAM

## 2024-07-19 PROCEDURE — 85025 COMPLETE CBC W/AUTO DIFF WBC: CPT

## 2024-07-19 PROCEDURE — 81001 URINALYSIS AUTO W/SCOPE: CPT

## 2024-07-19 PROCEDURE — 83690 ASSAY OF LIPASE: CPT

## 2024-07-19 PROCEDURE — 74177 CT ABD & PELVIS W/CONTRAST: CPT

## 2024-07-19 PROCEDURE — 96375 TX/PRO/DX INJ NEW DRUG ADDON: CPT

## 2024-07-19 PROCEDURE — 80053 COMPREHEN METABOLIC PANEL: CPT

## 2024-07-19 RX ORDER — TAMSULOSIN HYDROCHLORIDE 0.4 MG/1
0.4 CAPSULE ORAL DAILY
Qty: 30 CAPSULE | Refills: 0 | Status: SHIPPED | OUTPATIENT
Start: 2024-07-19

## 2024-07-19 RX ORDER — ONDANSETRON 2 MG/ML
4 INJECTION INTRAMUSCULAR; INTRAVENOUS ONCE
Status: COMPLETED | OUTPATIENT
Start: 2024-07-19 | End: 2024-07-19

## 2024-07-19 RX ORDER — MORPHINE SULFATE 2 MG/ML
2 INJECTION, SOLUTION INTRAMUSCULAR; INTRAVENOUS ONCE
Status: COMPLETED | OUTPATIENT
Start: 2024-07-19 | End: 2024-07-19

## 2024-07-19 RX ORDER — IBUPROFEN 600 MG/1
600 TABLET ORAL 4 TIMES DAILY PRN
Qty: 40 TABLET | Refills: 0 | Status: SHIPPED | OUTPATIENT
Start: 2024-07-19

## 2024-07-19 RX ORDER — TAMSULOSIN HYDROCHLORIDE 0.4 MG/1
0.4 CAPSULE ORAL DAILY
Status: DISCONTINUED | OUTPATIENT
Start: 2024-07-19 | End: 2024-07-19 | Stop reason: HOSPADM

## 2024-07-19 RX ORDER — KETOROLAC TROMETHAMINE 30 MG/ML
15 INJECTION, SOLUTION INTRAMUSCULAR; INTRAVENOUS
Status: COMPLETED | OUTPATIENT
Start: 2024-07-19 | End: 2024-07-19

## 2024-07-19 RX ADMIN — MORPHINE SULFATE 2 MG: 2 INJECTION, SOLUTION INTRAMUSCULAR; INTRAVENOUS at 11:19

## 2024-07-19 RX ADMIN — TAMSULOSIN HYDROCHLORIDE 0.4 MG: 0.4 CAPSULE ORAL at 13:27

## 2024-07-19 RX ADMIN — ONDANSETRON 4 MG: 2 INJECTION INTRAMUSCULAR; INTRAVENOUS at 11:16

## 2024-07-19 RX ADMIN — KETOROLAC TROMETHAMINE 15 MG: 30 INJECTION, SOLUTION INTRAMUSCULAR at 13:27

## 2024-07-19 RX ADMIN — IOPAMIDOL 100 ML: 755 INJECTION, SOLUTION INTRAVENOUS at 11:32

## 2024-07-19 ASSESSMENT — PAIN DESCRIPTION - DESCRIPTORS
DESCRIPTORS: SHARP
DESCRIPTORS: SHARP

## 2024-07-19 ASSESSMENT — PAIN DESCRIPTION - ORIENTATION
ORIENTATION: RIGHT;LOWER
ORIENTATION: RIGHT;LOWER

## 2024-07-19 ASSESSMENT — PAIN SCALES - GENERAL
PAINLEVEL_OUTOF10: 8
PAINLEVEL_OUTOF10: 8

## 2024-07-19 ASSESSMENT — PAIN DESCRIPTION - FREQUENCY: FREQUENCY: CONTINUOUS

## 2024-07-19 ASSESSMENT — PAIN DESCRIPTION - LOCATION
LOCATION: ABDOMEN
LOCATION: ABDOMEN

## 2024-07-19 NOTE — DISCHARGE INSTRUCTIONS
You were seen today for abdominal pain and diagnosed with a kidney stone. Your stone should pass on its own, please take flomax to ease passage, motrin for pain control and follow up with urology in next week. Please return to ED for fevers, worsening pain, persistent vomiting or any other new or concerning symptoms.     Thank you for letting us take care of you, hope you feel better soon,  Zuleima Jhaveri MD

## 2024-07-19 NOTE — ED PROVIDER NOTES
Granulocytes % 1 (*)     Immature Granulocytes Absolute 0.1 (*)     All other components within normal limits   COMPREHENSIVE METABOLIC PANEL - Abnormal; Notable for the following components:    Glucose 153 (*)     BUN 29 (*)     BUN/Creatinine Ratio 24 (*)     All other components within normal limits   LIPASE       All other labs were within normal range or not returned as of this dictation.    EMERGENCY DEPARTMENT COURSE and DIFFERENTIAL DIAGNOSIS/MDM:   Vitals:    Vitals:    07/19/24 1031 07/19/24 1032 07/19/24 1300 07/19/24 1330   BP: (!) 141/81  134/79 128/80   Pulse:  66     Resp: 14      Temp:  98 °F (36.7 °C)     TempSrc:  Oral     SpO2: 96%  98% 97%   Weight: 93 kg (205 lb)              Medical Decision Making  Pt presenting with 2 days of RLQ pain, nausea, no vomiting, no diarrhea, last BM 2 days ago. No fevers  Hemodynamically stable, NAD, RLQ tenderness,soft, no gaurding.   CBC, CMP, UA, CT abdomen obtained.   Ddx appendicitis, constipation, SBO, nephrolithiasis, cystitis  Labs reviewed and reassuring, no leukocytosis to suggest infection. No SADE, no UTI on urinalysis.   CT with evidence of obstructing stone, 4.3mm in size, will likely pass on its own, pt given flomax and ketorolac.   Pt pain and nausea controlled on re-evaluation.   Advised on flomax and motrin at home and close Urology follow up. Dc in stable condition with plan for follow up     Amount and/or Complexity of Data Reviewed  Labs: ordered. Decision-making details documented in ED Course.  Radiology: ordered. Decision-making details documented in ED Course.    Risk  Prescription drug management.                REASSESSMENT     ED Course as of 07/19/24 1415   Fri Jul 19, 2024   1049 WBC: 8.1 [SL]   1049 Nitrite, Urine: Negative [SL]   1049 Leukocyte Esterase, Urine: Negative [SL]   1150 Comprehensive Metabolic Panel(!):    Sodium 144   Potassium 4.5   Chloride 107   CARBON DIOXIDE 27   Anion Gap 10   Glucose 153(!)   BUN,BUNPL 29(!)

## 2024-07-19 NOTE — ED TRIAGE NOTES
Patient arrives in the ED ambulatory, alert and oriented x 4, breaths even and unlabored and in no acute distress with complaints of RLQ abdominal pain that started yesterday.     Pt reports that yesterday was \"uncomfortable, today the pain is intense.  Pt states he nauseous but no vomiting. Pt has hx of kidney stones.

## 2024-10-11 ENCOUNTER — HOSPITAL ENCOUNTER (EMERGENCY)
Facility: HOSPITAL | Age: 79
Discharge: HOME | End: 2024-10-11
Attending: EMERGENCY MEDICINE
Payer: MEDICARE

## 2024-10-11 ENCOUNTER — APPOINTMENT (OUTPATIENT)
Facility: HOSPITAL | Age: 79
End: 2024-10-11
Payer: MEDICARE

## 2024-10-11 VITALS
HEIGHT: 74 IN | HEART RATE: 61 BPM | OXYGEN SATURATION: 99 % | DIASTOLIC BLOOD PRESSURE: 76 MMHG | BODY MASS INDEX: 26.31 KG/M2 | RESPIRATION RATE: 16 BRPM | SYSTOLIC BLOOD PRESSURE: 136 MMHG | TEMPERATURE: 98.2 F | WEIGHT: 205 LBS

## 2024-10-11 DIAGNOSIS — N20.0 KIDNEY STONE: Primary | ICD-10-CM

## 2024-10-11 DIAGNOSIS — R10.9 LEFT FLANK PAIN: ICD-10-CM

## 2024-10-11 DIAGNOSIS — N13.2 HYDRONEPHROSIS WITH URINARY OBSTRUCTION DUE TO URETERAL CALCULUS: ICD-10-CM

## 2024-10-11 LAB
ALBUMIN SERPL-MCNC: 4.3 G/DL (ref 3.5–5.2)
ALBUMIN/GLOB SERPL: 1.7 (ref 1.1–2.2)
ALP SERPL-CCNC: 62 U/L (ref 40–129)
ALT SERPL-CCNC: 10 U/L (ref 10–50)
ANION GAP SERPL CALC-SCNC: 10 MMOL/L (ref 2–12)
APPEARANCE UR: CLEAR
AST SERPL-CCNC: 22 U/L (ref 10–50)
BACTERIA URNS QL MICRO: NEGATIVE /HPF
BASOPHILS # BLD: 0.1 K/UL (ref 0–1)
BASOPHILS NFR BLD: 1 % (ref 0–1)
BILIRUB SERPL-MCNC: 0.6 MG/DL (ref 0.2–1)
BILIRUB UR QL: NEGATIVE
BUN SERPL-MCNC: 20 MG/DL (ref 8–23)
BUN/CREAT SERPL: 18 (ref 12–20)
CALCIUM SERPL-MCNC: 8.8 MG/DL (ref 8.8–10.2)
CHLORIDE SERPL-SCNC: 104 MMOL/L (ref 98–107)
CO2 SERPL-SCNC: 26 MMOL/L (ref 22–29)
COLOR UR: ABNORMAL
CREAT SERPL-MCNC: 1.14 MG/DL (ref 0.7–1.2)
DIFFERENTIAL METHOD BLD: ABNORMAL
EOSINOPHIL # BLD: 0.1 K/UL (ref 0–0.4)
EOSINOPHIL NFR BLD: 1 % (ref 0–7)
EPITH CASTS URNS QL MICRO: ABNORMAL /LPF
ERYTHROCYTE [DISTWIDTH] IN BLOOD BY AUTOMATED COUNT: 12.5 % (ref 11.5–14.5)
GLOBULIN SER CALC-MCNC: 2.5 G/DL (ref 2–4)
GLUCOSE SERPL-MCNC: 171 MG/DL (ref 65–100)
GLUCOSE UR STRIP.AUTO-MCNC: NEGATIVE MG/DL
HCT VFR BLD AUTO: 50.1 % (ref 36.6–50.3)
HGB BLD-MCNC: 17.4 G/DL (ref 12.1–17)
HGB UR QL STRIP: ABNORMAL
IMM GRANULOCYTES # BLD AUTO: 0.1 K/UL (ref 0–0.04)
IMM GRANULOCYTES NFR BLD AUTO: 1 % (ref 0–0.5)
KETONES UR QL STRIP.AUTO: NEGATIVE MG/DL
LEUKOCYTE ESTERASE UR QL STRIP.AUTO: NEGATIVE
LIPASE SERPL-CCNC: 27 U/L (ref 13–60)
LYMPHOCYTES # BLD: 0.9 K/UL (ref 0.8–3.5)
LYMPHOCYTES NFR BLD: 11 % (ref 12–49)
MCH RBC QN AUTO: 30.9 PG (ref 26–34)
MCHC RBC AUTO-ENTMCNC: 34.7 G/DL (ref 30–36.5)
MCV RBC AUTO: 88.8 FL (ref 80–99)
MONOCYTES # BLD: 0.6 K/UL (ref 0–1)
MONOCYTES NFR BLD: 7 % (ref 5–13)
MUCOUS THREADS URNS QL MICRO: ABNORMAL /LPF
NEUTS SEG # BLD: 6.8 K/UL (ref 1.8–8)
NEUTS SEG NFR BLD: 79 % (ref 32–75)
NITRITE UR QL STRIP.AUTO: NEGATIVE
NRBC # BLD: 0 K/UL (ref 0–0.01)
NRBC BLD-RTO: 0 PER 100 WBC
PH UR STRIP: 5.5 (ref 5–8)
PLATELET # BLD AUTO: 161 K/UL (ref 150–400)
PMV BLD AUTO: 9.6 FL (ref 8.9–12.9)
POTASSIUM SERPL-SCNC: 4.2 MMOL/L (ref 3.5–5.1)
PROT SERPL-MCNC: 6.8 G/DL (ref 6.4–8.3)
PROT UR STRIP-MCNC: NEGATIVE MG/DL
RBC # BLD AUTO: 5.64 M/UL (ref 4.1–5.7)
RBC #/AREA URNS HPF: ABNORMAL /HPF (ref 0–5)
SODIUM SERPL-SCNC: 140 MMOL/L (ref 136–145)
SP GR UR REFRACTOMETRY: 1.02 (ref 1–1.03)
SPECIMEN HOLD: NORMAL
UROBILINOGEN UR QL STRIP.AUTO: 0.2 EU/DL (ref 0.2–1)
WBC # BLD AUTO: 8.5 K/UL (ref 4.1–11.1)
WBC URNS QL MICRO: ABNORMAL /HPF (ref 0–4)

## 2024-10-11 PROCEDURE — 81001 URINALYSIS AUTO W/SCOPE: CPT

## 2024-10-11 PROCEDURE — 99284 EMERGENCY DEPT VISIT MOD MDM: CPT

## 2024-10-11 PROCEDURE — 6360000002 HC RX W HCPCS: Performed by: EMERGENCY MEDICINE

## 2024-10-11 PROCEDURE — 6370000000 HC RX 637 (ALT 250 FOR IP): Performed by: EMERGENCY MEDICINE

## 2024-10-11 PROCEDURE — 80053 COMPREHEN METABOLIC PANEL: CPT

## 2024-10-11 PROCEDURE — 96375 TX/PRO/DX INJ NEW DRUG ADDON: CPT

## 2024-10-11 PROCEDURE — 85025 COMPLETE CBC W/AUTO DIFF WBC: CPT

## 2024-10-11 PROCEDURE — 83690 ASSAY OF LIPASE: CPT

## 2024-10-11 PROCEDURE — 96374 THER/PROPH/DIAG INJ IV PUSH: CPT

## 2024-10-11 PROCEDURE — 36415 COLL VENOUS BLD VENIPUNCTURE: CPT

## 2024-10-11 PROCEDURE — 74176 CT ABD & PELVIS W/O CONTRAST: CPT

## 2024-10-11 RX ORDER — KETOROLAC TROMETHAMINE 30 MG/ML
15 INJECTION, SOLUTION INTRAMUSCULAR; INTRAVENOUS ONCE
Status: COMPLETED | OUTPATIENT
Start: 2024-10-11 | End: 2024-10-11

## 2024-10-11 RX ORDER — TAMSULOSIN HYDROCHLORIDE 0.4 MG/1
0.4 CAPSULE ORAL DAILY
Qty: 30 CAPSULE | Refills: 0 | Status: SHIPPED | OUTPATIENT
Start: 2024-10-11

## 2024-10-11 RX ORDER — ACETAMINOPHEN 500 MG
1000 TABLET ORAL
Status: COMPLETED | OUTPATIENT
Start: 2024-10-11 | End: 2024-10-11

## 2024-10-11 RX ORDER — ONDANSETRON 2 MG/ML
4 INJECTION INTRAMUSCULAR; INTRAVENOUS ONCE
Status: COMPLETED | OUTPATIENT
Start: 2024-10-11 | End: 2024-10-11

## 2024-10-11 RX ORDER — ACETAMINOPHEN 325 MG/1
650 TABLET ORAL EVERY 6 HOURS PRN
Qty: 120 TABLET | Refills: 3 | Status: SHIPPED | OUTPATIENT
Start: 2024-10-11

## 2024-10-11 RX ORDER — ONDANSETRON 4 MG/1
4 TABLET, ORALLY DISINTEGRATING ORAL EVERY 8 HOURS PRN
Qty: 30 TABLET | Refills: 0 | Status: SHIPPED | OUTPATIENT
Start: 2024-10-11

## 2024-10-11 RX ORDER — OXYCODONE HYDROCHLORIDE 5 MG/1
5 TABLET ORAL EVERY 4 HOURS PRN
Qty: 10 TABLET | Refills: 0 | Status: SHIPPED | OUTPATIENT
Start: 2024-10-11 | End: 2024-10-14

## 2024-10-11 RX ORDER — IBUPROFEN 600 MG/1
600 TABLET, FILM COATED ORAL EVERY 6 HOURS PRN
Qty: 28 TABLET | Refills: 0 | Status: SHIPPED | OUTPATIENT
Start: 2024-10-11 | End: 2024-10-18

## 2024-10-11 RX ADMIN — KETOROLAC TROMETHAMINE 15 MG: 30 INJECTION, SOLUTION INTRAMUSCULAR at 05:38

## 2024-10-11 RX ADMIN — ONDANSETRON 4 MG: 2 INJECTION INTRAMUSCULAR; INTRAVENOUS at 05:39

## 2024-10-11 RX ADMIN — ACETAMINOPHEN 1000 MG: 500 TABLET ORAL at 06:56

## 2024-10-11 ASSESSMENT — PAIN - FUNCTIONAL ASSESSMENT
PAIN_FUNCTIONAL_ASSESSMENT: 0-10
PAIN_FUNCTIONAL_ASSESSMENT: 0-10

## 2024-10-11 ASSESSMENT — PAIN DESCRIPTION - ORIENTATION: ORIENTATION: LEFT

## 2024-10-11 ASSESSMENT — PAIN SCALES - GENERAL
PAINLEVEL_OUTOF10: 4
PAINLEVEL_OUTOF10: 2
PAINLEVEL_OUTOF10: 3
PAINLEVEL_OUTOF10: 6

## 2024-10-11 NOTE — ED PROVIDER NOTES
[facility] [place]  EMERGENCY DEPARTMENT ENCOUNTER      Pt Name: Jamin Pan  MRN: [id number]  Birthdate 1945  Date of evaluation: 10/11/2024  Provider: Duc Olivas MD    CHIEF COMPLAINT       Chief Complaint   Patient presents with    Flank Pain       EMERGENCY DEPARTMENT COURSE and DIFFERENTIAL DIAGNOSIS/MDM:   Medical Decision Making  79-year-old male presenting ER with complaint of left flank pain.  Patient has history of multiple kidney stones in the past and reports pain feels like previous kidney stones.  Patient was seen a couple of months ago for kidney stones on the right.  Patient denies any abdominal surgeries denies any history of diverticulitis or diverticulosis.  Patient reports urinary frequency but denies any blood in the urine patient initially thought he was having constipation however became more sharp and intense earlier.  Reports pain is slightly improved at this time.  Associated nausea without vomiting.  No diarrhea.  Denies any pain and or swelling of the testicles or penis.  On exam patient in no acute distress with nonreproducible pain in the left lower quadrant where patient indicates he is having pain at this time.  Pain does not radiate currently.  Patient reports symptoms started suddenly.  Patient has normal vital signs afebrile no tachycardia.  Ordered labs electrolytes urinalysis will obtain CAT scan abdomen pelvis without contrast.  Pain management and antiemetics.  Patient's symptoms likely kidney stone however rule out diverticulitis or urinary tract infection/pyelonephritis or other intra-abdominal pathology.  Recent imaging with IV contrast showing no evidence of AAA.  Patient not having any pulsatile mass on exam no lower leg swelling normal pulses.  X-ray showing 4 mm UVJ stone with moderate hydronephrosis.  Urine with no signs urinary tract infection normal kidney function.  Patient's pain improved.  Advised patient to follow-up with his urologist.

## 2024-10-11 NOTE — ED TRIAGE NOTES
Pt arrives POV c/o left flank pain. Pt has a hx of kidney stones and states this feels the same. Pt denies CP, SOB, N/V/D.

## 2024-10-11 NOTE — FLOWSHEET NOTE
Discharge instruction reviewed by Milagro Rodriguez RN with the patient.  The patient verbalized understanding. Patient provided with AVS.      Patient is ambulatory and steady gait upon discharge. Patient is AAOX4, breathing even and unlabored, skin warm and dry, skin intact.    Patient mobility status  with no difficulty. Provider aware     Patient left ED via Discharge Method: ambulatory to Home with  self .    Opportunity for questions and clarification provided.     Patient given 4 paper scripts.